# Patient Record
Sex: FEMALE | Race: WHITE | NOT HISPANIC OR LATINO | Employment: FULL TIME | URBAN - METROPOLITAN AREA
[De-identification: names, ages, dates, MRNs, and addresses within clinical notes are randomized per-mention and may not be internally consistent; named-entity substitution may affect disease eponyms.]

---

## 2017-05-13 ENCOUNTER — OFFICE VISIT - RIVER FALLS (OUTPATIENT)
Dept: FAMILY MEDICINE | Facility: CLINIC | Age: 32
End: 2017-05-13

## 2017-06-29 ENCOUNTER — COMMUNICATION - RIVER FALLS (OUTPATIENT)
Dept: FAMILY MEDICINE | Facility: CLINIC | Age: 32
End: 2017-06-29

## 2017-06-29 ENCOUNTER — OFFICE VISIT - RIVER FALLS (OUTPATIENT)
Dept: FAMILY MEDICINE | Facility: CLINIC | Age: 32
End: 2017-06-29

## 2017-06-29 ASSESSMENT — MIFFLIN-ST. JEOR: SCORE: 1341.81

## 2017-10-12 ENCOUNTER — OFFICE VISIT - RIVER FALLS (OUTPATIENT)
Dept: FAMILY MEDICINE | Facility: CLINIC | Age: 32
End: 2017-10-12

## 2018-07-19 ENCOUNTER — OFFICE VISIT - RIVER FALLS (OUTPATIENT)
Dept: FAMILY MEDICINE | Facility: CLINIC | Age: 33
End: 2018-07-19

## 2018-07-19 ASSESSMENT — MIFFLIN-ST. JEOR: SCORE: 1388.98

## 2020-06-30 ENCOUNTER — OFFICE VISIT - RIVER FALLS (OUTPATIENT)
Dept: FAMILY MEDICINE | Facility: CLINIC | Age: 35
End: 2020-06-30

## 2020-07-06 ENCOUNTER — OFFICE VISIT - HEALTHEAST (OUTPATIENT)
Dept: BEHAVIORAL HEALTH | Facility: TELEHEALTH | Age: 35
End: 2020-07-06

## 2020-07-06 DIAGNOSIS — F43.22 ADJUSTMENT DISORDER WITH ANXIOUS MOOD: ICD-10-CM

## 2020-07-15 ENCOUNTER — OFFICE VISIT - HEALTHEAST (OUTPATIENT)
Dept: BEHAVIORAL HEALTH | Facility: TELEHEALTH | Age: 35
End: 2020-07-15

## 2020-07-15 DIAGNOSIS — F43.22 ADJUSTMENT DISORDER WITH ANXIETY: ICD-10-CM

## 2020-07-15 ASSESSMENT — ANXIETY QUESTIONNAIRES
3. WORRYING TOO MUCH ABOUT DIFFERENT THINGS: MORE THAN HALF THE DAYS
GAD7 TOTAL SCORE: 11
6. BECOMING EASILY ANNOYED OR IRRITABLE: SEVERAL DAYS
2. NOT BEING ABLE TO STOP OR CONTROL WORRYING: MORE THAN HALF THE DAYS
5. BEING SO RESTLESS THAT IT IS HARD TO SIT STILL: SEVERAL DAYS
7. FEELING AFRAID AS IF SOMETHING AWFUL MIGHT HAPPEN: SEVERAL DAYS
IF YOU CHECKED OFF ANY PROBLEMS ON THIS QUESTIONNAIRE, HOW DIFFICULT HAVE THESE PROBLEMS MADE IT FOR YOU TO DO YOUR WORK, TAKE CARE OF THINGS AT HOME, OR GET ALONG WITH OTHER PEOPLE: SOMEWHAT DIFFICULT
1. FEELING NERVOUS, ANXIOUS, OR ON EDGE: MORE THAN HALF THE DAYS
4. TROUBLE RELAXING: MORE THAN HALF THE DAYS

## 2020-07-15 ASSESSMENT — PATIENT HEALTH QUESTIONNAIRE - PHQ9: SUM OF ALL RESPONSES TO PHQ QUESTIONS 1-9: 8

## 2020-09-08 ENCOUNTER — OFFICE VISIT - RIVER FALLS (OUTPATIENT)
Dept: FAMILY MEDICINE | Facility: CLINIC | Age: 35
End: 2020-09-08

## 2020-09-08 ASSESSMENT — MIFFLIN-ST. JEOR: SCORE: 1474.26

## 2021-05-27 ASSESSMENT — PATIENT HEALTH QUESTIONNAIRE - PHQ9: SUM OF ALL RESPONSES TO PHQ QUESTIONS 1-9: 8

## 2021-05-28 ASSESSMENT — ANXIETY QUESTIONNAIRES: GAD7 TOTAL SCORE: 11

## 2021-06-16 PROBLEM — F43.22 ADJUSTMENT DISORDER WITH ANXIETY: Status: ACTIVE | Noted: 2020-07-15

## 2022-01-30 ENCOUNTER — HEALTH MAINTENANCE LETTER (OUTPATIENT)
Age: 37
End: 2022-01-30

## 2022-02-11 VITALS
HEART RATE: 80 BPM | OXYGEN SATURATION: 99 % | BODY MASS INDEX: 25.18 KG/M2 | WEIGHT: 156 LBS | TEMPERATURE: 98.4 F | DIASTOLIC BLOOD PRESSURE: 87 MMHG | SYSTOLIC BLOOD PRESSURE: 143 MMHG

## 2022-02-11 VITALS
TEMPERATURE: 98.2 F | SYSTOLIC BLOOD PRESSURE: 106 MMHG | WEIGHT: 142.2 LBS | BODY MASS INDEX: 22.95 KG/M2 | HEART RATE: 60 BPM | DIASTOLIC BLOOD PRESSURE: 69 MMHG

## 2022-02-11 VITALS
HEIGHT: 66 IN | HEART RATE: 80 BPM | HEART RATE: 76 BPM | BODY MASS INDEX: 30.51 KG/M2 | SYSTOLIC BLOOD PRESSURE: 132 MMHG | SYSTOLIC BLOOD PRESSURE: 130 MMHG | DIASTOLIC BLOOD PRESSURE: 81 MMHG | WEIGHT: 167 LBS | TEMPERATURE: 98.8 F | HEIGHT: 66 IN | TEMPERATURE: 97.2 F | BODY MASS INDEX: 26.84 KG/M2 | DIASTOLIC BLOOD PRESSURE: 80 MMHG

## 2022-02-11 VITALS
HEIGHT: 66 IN | WEIGHT: 148.2 LBS | HEART RATE: 72 BPM | DIASTOLIC BLOOD PRESSURE: 60 MMHG | BODY MASS INDEX: 23.82 KG/M2 | SYSTOLIC BLOOD PRESSURE: 112 MMHG

## 2022-02-11 VITALS
DIASTOLIC BLOOD PRESSURE: 72 MMHG | OXYGEN SATURATION: 99 % | TEMPERATURE: 98.4 F | HEART RATE: 82 BPM | HEIGHT: 66 IN | SYSTOLIC BLOOD PRESSURE: 110 MMHG | BODY MASS INDEX: 22.14 KG/M2 | WEIGHT: 137.8 LBS

## 2022-02-16 NOTE — NURSING NOTE
CAGE Assessment Entered On:  7/3/2020 11:39 AM CDT    Performed On:  6/30/2020 11:39 AM CDT by Mihir Wiseman CMA               Assessment   Have you ever felt you should cut down on your drinking :   Yes   Have people annoyed you by criticizing your drinking :   No   Have you ever felt bad or guilty about your drinking :   Yes   Have you ever taken a drink first thing in the morning to steady your nerves or get rid of a hangover (Eye-opener) :   No   CAGE Score :   2    Mihir Wiseman CMA - 7/3/2020 11:39 AM CDT

## 2022-02-16 NOTE — NURSING NOTE
Comprehensive Intake Entered On:  6/30/2020 12:53 PM CDT    Performed On:  6/30/2020 12:45 PM CDT by Rosa Maria Castrejon CMA               Summary   Chief Complaint :   Patient presents with moles on left arm and chin that are changing in size and color. Also, c/o increased anxiety and stress due to son's new dx of muscular dystophy.    Last Menstrual Period :   6/1/2020 CDT   Menstrual Status :   Menarcheal   Weight Measured :   156 lb(Converted to: 156 lb 0 oz, 70.76 kg)    Height/Length Estimated :   66 cm(Converted to: 2 ft 2 in, 25.98 in)    Systolic Blood Pressure :   143 mmHg (HI)    Diastolic Blood Pressure :   87 mmHg   Mean Arterial Pressure :   106 mmHg   Peripheral Pulse Rate :   80 bpm   BP Site :   Right arm   BP Method :   Electronic   Temperature Tympanic :   98.4 DegF(Converted to: 36.9 DegC)    Oxygen Saturation :   99 %   Rosa Maria Castrejon CMA - 6/30/2020 12:45 PM CDT   Health Status   Allergies Verified? :   Yes   Medication History Verified? :   Yes   Medical History Verified? :   Yes   Pre-Visit Planning Status :   Completed   Tobacco Use? :   Current every day smoker   (Comment: Chewing tobacco [Rosa Maria Castrejon CMA - 6/30/2020 12:45 PM CDT] )   Rosa Maria Castrejon CMA - 6/30/2020 12:45 PM CDT   Consents   Consent for Immunization Exchange :   Consent Granted   Consent for Immunizations to Providers :   Consent Granted   Rosa Maria Castrejon CMA - 6/30/2020 12:45 PM CDT   Meds / Allergies   (As Of: 6/30/2020 12:53:33 PM CDT)   Allergies (Active)   No Known Medication Allergies  Estimated Onset Date:   Unspecified ; Created By:   Rosa Maria Castrejon CMA; Reaction Status:   Active ; Category:   Drug ; Substance:   No Known Medication Allergies ; Type:   Allergy ; Updated By:   Rosa Maria Castrejon CMA; Reviewed Date:   6/30/2020 12:49 PM CDT        Medication List   (As Of: 6/30/2020 12:53:33 PM CDT)   Prescription/Discharge Order    cyclobenzaprine  :   cyclobenzaprine ; Status:   Prescribed ; Ordered As Mnemonic:    cyclobenzaprine 5 mg oral tablet ; Simple Display Line:   1-2 tab(s), Oral, q8 hrs, 10 tab(s), 0 Refill(s) ; Ordering Provider:   Turner Almaguer MD; Catalog Code:   cyclobenzaprine ; Order Dt/Tm:   7/19/2018 1:40:18 PM CDT            ID Risk Screen   Recent Travel History :   No recent travel   Family Member Travel History :   No recent travel   Other Exposure to Infectious Disease :   Unknown   Rosa Maria Castrejon CMA - 6/30/2020 12:45 PM CDT

## 2022-02-16 NOTE — NURSING NOTE
Comprehensive Intake Entered On:  9/8/2020 11:21 AM CDT    Performed On:  9/8/2020 11:19 AM CDT by Jaelyn Hurt               Summary   Chief Complaint :   Fell down stairs over the weekend. Left knee pain.    Menstrual Status :   Menarcheal   Weight Measured :   167.0 lb(Converted to: 167 lb 0 oz, 75.75 kg)    Height Measured :   66 in(Converted to: 5 ft 6 in, 167.64 cm)    Body Mass Index :   26.95 kg/m2 (HI)    Body Surface Area :   1.88 m2   Height/Length Estimated :   66 cm(Converted to: 2 ft 2 in, 25.98 in)    Systolic Blood Pressure :   132 mmHg (HI)    Diastolic Blood Pressure :   81 mmHg (HI)    Mean Arterial Pressure :   98 mmHg   Peripheral Pulse Rate :   80 bpm   BP Method :   Electronic   HR Method :   Electronic   Temperature Tympanic :   97.2 DegF(Converted to: 36.2 DegC)  (LOW)    Jaelyn Hurt - 9/8/2020 11:19 AM CDT   Health Status   Allergies Verified? :   Yes   Medication History Verified? :   Yes   Tobacco Use? :   Current every day smoker   Jaelyn Hurt - 9/8/2020 11:19 AM CDT   Meds / Allergies   (As Of: 9/8/2020 11:21:07 AM CDT)   Allergies (Active)   No Known Medication Allergies  Estimated Onset Date:   Unspecified ; Created By:   Rosa Maria Castrejon CMA; Reaction Status:   Active ; Category:   Drug ; Substance:   No Known Medication Allergies ; Type:   Allergy ; Updated By:   Rosa Maria Castrejon CMA; Reviewed Date:   6/30/2020 1:06 PM CDT        Medication List   (As Of: 9/8/2020 11:21:07 AM CDT)   Prescription/Discharge Order    escitalopram  :   escitalopram ; Status:   Processing ; Ordered As Mnemonic:   Lexapro 5 mg oral tablet ; Ordering Provider:   Gerardo Freeman MD; Action Display:   Complete ; Catalog Code:   escitalopram ; Order Dt/Tm:   9/8/2020 11:20:00 AM CDT          cyclobenzaprine  :   cyclobenzaprine ; Status:   Processing ; Ordered As Mnemonic:   cyclobenzaprine 5 mg oral tablet ; Ordering Provider:   Turner Almaguer MD; Action Display:   Complete ; Catalog Code:    cyclobenzaprine ; Order Dt/Tm:   9/8/2020 11:20:00 AM CDT            ID Risk Screen   Recent Travel History :   No recent travel   Family Member Travel History :   No recent travel   Other Exposure to Infectious Disease :   Unknown   Jaelyn Hurt - 9/8/2020 11:19 AM CDT

## 2022-02-16 NOTE — PROGRESS NOTES
Patient:   YUDITH ARRIETA            MRN: [id number]            FIN: [id number]               Age:   34 years     Sex:  Female     :  1985   Associated Diagnoses:   New ACL tear   Author:   Gerardo Freeman MD      Visit Information      Date of Service: 2020 11:14 am  Performing Location: [facility]  Encounter#: [id number]      Chief Complaint   2020 11:19 AM CDT    Fell down stairs over the weekend. Left knee pain.        History of Present Illness   chief complaint and symptoms as noted above confirmed with patient   She was carrying her daughter down the stairs when she lost her balance.  She fell down several stairs and watch her knee buckle inwardly.  She is had 2 prior ACL reconstruction is one prior meniscectomy of that knee.  She has a knee swelled quickly is been difficult to walk.  No other injuries other than a minor left ankle sprain.         Review of Systems   Constitutional:  Negative except as documented in history of present illness.    Musculoskeletal:  Negative except as documented in history of present illness.    Integumentary:  Negative.    Neurologic:  Negative except as documented in history of present illness.       Physical Examination   Vital Signs   2020 11:19 AM CDT Temperature Tympanic 97.2 DegF  LOW    Peripheral Pulse Rate 80 bpm    HR Method Electronic    Systolic Blood Pressure 132 mmHg  HI    Diastolic Blood Pressure 81 mmHg  HI    Mean Arterial Pressure 98 mmHg    BP Method Electronic      Measurements from flowsheet : Measurements   2020 11:19 AM CDT Height Measured - Standard 66 in    Height/Length Estimated 66 cm    Weight Measured - Standard 167.0 lb    BSA 1.88 m2    Body Mass Index 26.95 kg/m2  HI      General:  Alert and oriented, No acute distress.    Musculoskeletal:       Lower extremity exam: Knee ( Left, Anterior, Posterior, Lateral, Medial, Effusion, Swelling, Tenderness, Range of motion ( Diminished ) ).    Neurologic:  Alert,  Oriented.       Impression and Plan   Diagnosis     New ACL tear (IND19-WW S83.519A).     Course:  Not well controlled.    Plan:  Findings very suspicious for a re-tearing of her ACL graft.  We will get MRI set up in the meantime ice Tylenol ibuprofen crutches  .

## 2022-02-16 NOTE — NURSING NOTE
Generalized Anxiety Disorder Screening Entered On:  7/3/2020 11:40 AM CDT    Performed On:  6/30/2020 11:39 AM CDT by Mihir Wiseman CMA               Generalized Anxiety Disorder Screening   GIOVANNA Nervous, Anxious On Edge :   Nearly every day   GIOVANNA Control Worrying B :   More than half the days   GIOVANNA Worrying Too Much :   Several days   GIOVANNA Trouble Relaxing :   Nearly every day   GIOVANNA Restless :   More than half the days   GIOVANNA Easily Annoyed/Irritable :   Several days   GIOVANNA Afraid :   More than half the days   GIOVANNA Total Screening Score :   14    GIOVANNA Difficulty with Work, Home, Others :   Not difficult at all   Mihir Wiseman CMA - 7/3/2020 11:39 AM CDT

## 2022-02-16 NOTE — PROGRESS NOTES
Patient:   YUDITH ARRIETA            MRN: [id number]            FIN: [id number]               Age:   32 years     Sex:  Female     :  1985   Associated Diagnoses:   Right shoulder pain   Author:   Turner Almaguer MD      Chief Complaint   2018 11:05 AM CDT   c/o right sided neck and shoulder pain. When she does certain movements she gets pain that shoots down her right arm.      History of Present Illness   see chief complaint as noted above and confirmed with the patient   32 year old female presenting with right sided neck pain, the pain radiates down into right shower. If she tilts her head to the right side she gets a sharp pain that runs down through her right arm and hand. She tried to  her son out of his crib and had a lot of pain. Denies any recent or past injury. Denies any surgery . She is currently moving from her house and has been moving a lot around the house.      Review of Systems   Constitutional:  No fever, No chills.    Respiratory:  No shortness of breath.    Cardiovascular:  No chest pain.    Gastrointestinal:  No nausea, No vomiting.    Musculoskeletal:  Neck pain (right side), right shoulder pain, right arm pain, right hand pain.    Neurologic:  No numbness, No tingling, No headache.    Psychiatric:  Negative.              Health Status   Allergies:    Allergic Reactions (Selected)  No known allergies   Medications:  (Selected)      Problem list:    All Problems  Tobacco abuse / ICD-9-.1 / Confirmed  Inactive: Chemical Dependency / ICD-9-.90  Resolved: Pregnant / SNOMED CT 818616219      Histories   Past Medical History:    Active  Tobacco abuse (305.1)   Family History:       Procedure history:    Arthroscopy of knee with medial meniscus repair (24188421) on 10/16/2017 at 32 Years.  Comments:  2017 12:28 PM - Nati Simms.  Cystoscopy (68493041) on 11/15/2011 at 26 Years.  Repair of umbilical hernia (08964810) in  at 8 Years.  ACL/Meniscus Repair  Left Knee (x2).   Social History:        Alcohol Assessment            Current, 1-2 times per week, 3 drinks/episode average.      Tobacco Assessment            Past, Cigarettes, 20 per day.  8 year(s).  Started age 20 Years.  Stopped age 27 Years.      Substance Abuse Assessment            Past, Marijuana      Employment and Education Assessment            Employed      Home and Environment Assessment            Marital status: .      Nutrition and Health Assessment            Type of diet: Regular.      Exercise and Physical Activity Assessment            Exercise frequency: 3-4 times/week.  Exercise type: Running, Yoga.      Sexual Assessment            Sexually active: Yes.  Sexual orientation: Heterosexual.  Contraceptive Use Details: None.        Physical Examination   Vital Signs   7/19/2018 11:05 AM CDT Peripheral Pulse Rate 72 bpm    Systolic Blood Pressure 112 mmHg    Diastolic Blood Pressure 60 mmHg    Mean Arterial Pressure 77 mmHg      Measurements from flowsheet : Measurements   7/19/2018 11:05 AM CDT Height Measured - Standard 66 in    Weight Measured - Standard 148.2 lb    BSA 1.77 m2    Body Mass Index 23.92 kg/m2      General:  Alert and oriented, No acute distress.    Eye:  Pupils are equal, round and reactive to light, Normal conjunctiva.    HENT:  Oral mucosa is moist.    Neck:  Supple.    Respiratory:  Respirations are non-labored.    Cardiovascular:  Normal rate, Regular rhythm, No edema.    Gastrointestinal:  Non-distended.    Musculoskeletal:  Normal gait.    Integumentary:  Warm, No rash.    Psychiatric:  Cooperative, Appropriate mood & affect, Normal judgment.       Review / Management   Results review      Impression and Plan   Diagnosis     Right shoulder pain (PDL92-RD M25.511).     Plan:  exam is consistant with muscle strain of the back of the neck. Will give her cyclobenzapine 5mg as needed for the pain. Discussed trying things like message, stretching, heat and patches like  salonpas to help with symtpoms.  I, Edna Osuna [facility], acted solely as a scribe for, and in the presence of Dr. Turner Almaguer who performed the service..

## 2022-02-16 NOTE — TELEPHONE ENCOUNTER
---------------------  From: Jaelyn Hurt   Sent: 9/15/2020 9:28:42 AM CDT  Subject: Result: MRI     Spoke with patient at 9:28am regarding recent MRI.    Per TFS; MRI shows a mild sprain of medial collateral ligament and questionable tear of the meniscus.  She should give it 6 weeks to heal and if not better by then, will refer to ortho. Patient agrees and has no questions.

## 2022-02-16 NOTE — TELEPHONE ENCOUNTER
Order is faxed to [facility] and they will contact patient and schedule.  Patient informed.

## 2022-02-16 NOTE — PROGRESS NOTES
Patient:   YUDITH ARRIETA            MRN: [id number]            FIN: [id number]               Age:   34 years     Sex:  Female     :  1985   Associated Diagnoses:   Anxiety state   Author:   Gerardo Freeman MD      Visit Information      Date of Service: 2020 12:40 pm  Performing Location: [facility]  Encounter#: [id number]      Primary Care Provider (PCP):  [id number] -UNKNOWN,    NPI# [id number]      Chief Complaint   2020 12:45 PM CDT   Patient presents with moles on left arm and chin that are changing in size and color. Also, c/o increased anxiety and stress due to son's new dx of muscular dystophy.        History of Present Illness   Patient is in today for a mole check but really she is here for anxiety.  She is got some moles on her arm and her chin really have not changed too much over time she like to does have them checked out she notes a bigger concern though is that her 4-year-old son was diagnosed recently with Duchenne s muscular dystrophy.  She has a lot of anxiety regarding this would like medication for it.         Review of Systems   Constitutional:  Negative except as documented in history of present illness.    Integumentary:  Negative except as documented in history of present illness.    Psychiatric:  Elizabeth.       Health Status   Allergies:    Allergic Reactions (Selected)  No Known Medication Allergies   Medications:  (Selected)   Prescriptions  Prescribed  Lexapro 5 mg oral tablet: = 1 tab(s) ( 5 mg ), Oral, daily, # 30 tab(s), 1 Refill(s), Type: Maintenance, Pharmacy: [id number], 1 tab(s) Oral daily, 66, in, 18 11:05:00 CDT, Height Measured, 156, lb, 20 12:45:00 CDT, Weight Measured  cyclobenzaprine 5 mg oral tablet: 1-2 tab(s), Oral, q8 hrs, # 10 tab(s), 0 Refill(s), Type: Maintenance, Pharmacy: [id number], 1-2 tab(s) Oral q8 hrs   Problem list:    All Problems  Tobacco abuse / ICD-9-.1 / Confirmed      Histories    Past Medical History:    Active  Tobacco abuse (305.1)  Resolved  Pregnancy (043726329): Onset on 10/8/2018 at 33 years.  Resolved on 7/5/2019 at 33 years.   Family History:    No family history items have been selected or recorded.   Procedure history:    Arthroscopy of knee with medial meniscus repair (SNOMED CT 44626584) performed by Al Rick MD on 10/16/2017 at 32 Years.  Comments:  11/21/2017 12:28 PM CST - Demi , Nati  Left.  Cystoscopy (SNOMED CT 09594468) performed by Dr. Wilks on 11/15/2011 at 26 Years.  Repair of umbilical hernia (SNOMED CT 12739750) in 1993 at 8 Years.  ACL/Meniscus Repair Left Knee (x2).   Social History:        Alcohol Assessment            Current, 1-2 times per week, 3 drinks/episode average.      Tobacco Assessment            Past, Cigarettes, 20 per day.  8 year(s).  Started age 20 Years.  Stopped age 27 Years.      Substance Abuse Assessment            Past, Marijuana      Employment and Education Assessment            Employed      Home and Environment Assessment            Marital status: .      Nutrition and Health Assessment            Type of diet: Regular.      Exercise and Physical Activity Assessment            Exercise frequency: 3-4 times/week.  Exercise type: Running, Yoga.      Sexual Assessment            Sexually active: Yes.  Sexual orientation: Heterosexual.  Contraceptive Use Details: None.        Physical Examination   Vital Signs   6/30/2020 12:45 PM CDT Temperature Tympanic 98.4 DegF    Peripheral Pulse Rate 80 bpm    Systolic Blood Pressure 143 mmHg  HI    Diastolic Blood Pressure 87 mmHg    Mean Arterial Pressure 106 mmHg    BP Site Right arm    BP Method Electronic    Oxygen Saturation 99 %      Measurements from flowsheet : Measurements   6/30/2020 12:45 PM CDT Height/Length Estimated 66 cm    Weight Measured - Standard 156 lb      General:  Alert and oriented, No acute distress.    Integumentary:  She has several very small  benign-appearing nevi on her left arm a few on her chin none appear suspicious  .    Neurologic:  Alert, Oriented.       Impression and Plan   Diagnosis     Anxiety state (LWG14-NM F41.1).     Course:  Not progressing as expected.    Plan:  Reassurance on her moles.  We discussed anxiety and treatment options he is going to see therapy we will went and put her on Lexapro warned of side effects she will see us back in 4 weeks  ,  15 min spent Face-to-face   .    Patient Instructions:       Counseled: Patient, Regarding diagnosis, Regarding treatment, Regarding medications.

## 2022-02-16 NOTE — PROGRESS NOTES
Patient:   YUDITH ARRIETA            MRN: [id number]            FIN: [id number]               Age:   32 years     Sex:  Female     :  1985   Associated Diagnoses:   Acute medial meniscal tear   Author:   Gerardo Freeman MD      Preoperative Information   Indication for surgery:  Meniscal tear.    Accompanied by:  No one.    Source of history:  Self.           Chief Complaint   10/12/2017 11:02 AM CDT  Preop.  Left knee meniscus repair. Dr. Rick.  [facility].  10/16/17        Review of Systems   Constitutional:  Negative.    Eye:  Negative.    Ear/Nose/Mouth/Throat:  Negative.    Respiratory:  Negative.    Cardiovascular:  Negative.    Gastrointestinal:  Negative.    Genitourinary:  Negative.    Hematology/Lymphatics:  Negative.    Endocrine:  Negative.    Immunologic:  Negative.    Musculoskeletal:  Negative.    Integumentary:  Negative.    Neurologic:  Negative.       Health Status   Allergies:    Allergic Reactions (Selected)  No known allergies   Medications:  (Selected)   Prescriptions  Prescribed  amoxicillin 875 mg oral tablet: 1 tab(s) ( 875 mg ), PO, BID, # 20 tab(s), 0 Refill(s), Type: Maintenance, Pharmacy: [id number], 1 tab(s) po bid,x10 day(s)  Documented Medications  Documented  Low-Ogestrel 0.3 mg-30 mcg oral tablet: 1 tab(s), po, daily, 0 Refill(s), Type: Maintenance  Prenatal Multivitamins with Vitamin B Complex, Vitamin C, Minerals and L-Methylfolate oral capsule...: 1 cap(s), po, daily, 0 Refill(s), Type: Maintenance   Problem list:    All Problems  Tobacco abuse / ICD-9-.1 / Confirmed  Inactive: Chemical Dependency / ICD-9-.90  Resolved: Pregnant / SNOMED CT 508433225      Histories   Past Medical History:    Active  Tobacco abuse (305.1)   Family History:       Procedure history:    Cystoscopy (SNOMED CT 75976506) performed by Dr. Wilks on 11/15/2011 at 26 Years.  Repair of umbilical hernia (SNOMED CT 28458720) in  at 8 Years.  ACL/Meniscus Repair Left  Knee (x2).   Social History:        Alcohol Assessment            Current, 1-2 times per week, 3 drinks/episode average.      Tobacco Assessment            Past, Cigarettes, 20 per day.  8 year(s).  Started age 20 Years.  Stopped age 27 Years.      Substance Abuse Assessment            Past, Marijuana      Employment and Education Assessment            Employed      Home and Environment Assessment            Marital status: .      Nutrition and Health Assessment            Type of diet: Regular.      Exercise and Physical Activity Assessment            Exercise frequency: 3-4 times/week.  Exercise type: Running, Yoga.      Sexual Assessment            Sexually active: Yes.  Sexual orientation: Heterosexual.  Contraceptive Use Details: None.       Has no history of anemia.  Has  no history of DVT or pulmonary embolism.  Has no personal history of bleeding problems.   Has no personal or family history of anesthesia reactions.  Patient  does not have active tuberculosis.    S/he  has not taken aspirin or aspirin containing products in the last week.     S/he  has not taken Plavix (Clopidogrel) in the last 2 weeks.    S/he  has not taken warfarin in the past week.    S/he  has not been on corticosteroids for more than 2 weeks recently.      S/he  is not DNR before, during or after surgery.      Asthma  or Bronchitis? no  Diabetes? no       Insulin/Orals?   Seizure Disorder? no  CKD?no  Thyroid Disease?no  Liver Diseaseno  CVA?no         Physical Examination   Vital Signs   10/12/2017 11:02 AM CDT Temperature Tympanic 98.2 DegF    Peripheral Pulse Rate 60 bpm    HR Method Electronic    Systolic Blood Pressure 106 mmHg    Diastolic Blood Pressure 69 mmHg    Mean Arterial Pressure 81 mmHg    BP Method Electronic      Measurements from flowsheet : Measurements   10/12/2017 11:02 AM CDT  Weight Measured - Standard                142.2 lb     General:  Alert and oriented, No acute distress.    Eye:  Pupils are equal,  round and reactive to light, Extraocular movements are intact.    HENT:  Normocephalic, Tympanic membranes are clear, Normal hearing, Oral mucosa is moist.    Neck:  Supple, Non-tender, No carotid bruit, No jugular venous distention, No lymphadenopathy, No thyromegaly.    Respiratory:  Lungs are clear to auscultation, Respirations are non-labored, Breath sounds are equal.    Cardiovascular:  Normal rate, Regular rhythm, No murmur, Good pulses equal in all extremities, No edema.    Gastrointestinal:  Soft, Non-tender, Non-distended, Normal bowel sounds, No organomegaly.    Musculoskeletal:  Normal range of motion, Normal strength, No tenderness, No swelling, No deformity.    Integumentary:  Warm, Dry.    Neurologic:  Alert, Oriented, Normal sensory, Normal motor function, No focal deficits, Cranial Nerves II-XII are grossly intact, Normal deep tendon reflexes.    Psychiatric:  Cooperative, Appropriate mood & affect, Normal judgment.       Impression and Plan   Diagnosis     Acute medial meniscal tear (UWO16-MG S83.249A).     Condition:  ok for surgery asa1.

## 2022-02-16 NOTE — PROGRESS NOTES
Patient:   YUDITH ARRIETA            MRN: [id number]            FIN: [id number]               Age:   31 years     Sex:  Female     :  1985   Associated Diagnoses:   Sore throat   Author:   Alfredo Henao PA-C      Chief Complaint   2017 8:50 AM CDT    pt here for poss strep throat x 2 days, states white spots in back of throat, low grade fever 99.5, ears beginning to be sore from sore throat      History of Present Illness   Chief complaint and symptoms noted above and confirmed with patient       Review of Systems   Constitutional:  Fever.    Ear/Nose/Mouth/Throat:  Sore throat.       Health Status   Allergies:    Allergic Reactions (Selected)  No known allergies   Problem list:    All Problems  Tobacco abuse / ICD-9-.1 / Confirmed  Inactive: Chemical Dependency / ICD-9-.90  Resolved: Pregnant / SNOMED CT [id number]   Medications:  (Selected)   Documented Medications  Documented  Low-Ogestrel 0.3 mg-30 mcg oral tablet: 1 tab(s), po, daily, 0 Refill(s), Type: Maintenance  Prenatal Multivitamins with Vitamin B Complex, Vitamin C, Minerals and L-Methylfolate oral capsule...: 1 cap(s), po, daily, 0 Refill(s), Type: Maintenance      Histories   Past Medical History:    Active  Tobacco abuse (305.1)   Family History:       Procedure history:    Cystoscopy (55063906) on 11/15/2011 at 26 Years.  Repair of umbilical hernia (07172287) in  at 8 Years.  ACL/Meniscus Repair Left Knee (x2).   Social History:        Alcohol Assessment            Current, 1-2 times per week, 3 drinks/episode average.      Tobacco Assessment            Past, Cigarettes, 20 per day.  8 year(s).  Started age 20 Years.  Stopped age 27 Years.      Substance Abuse Assessment            Past, Marijuana      Employment and Education Assessment            Employed      Home and Environment Assessment            Marital status: .      Nutrition and Health Assessment            Type of diet: Regular.      Exercise and Physical  Activity Assessment            Exercise frequency: 3-4 times/week.  Exercise type: Running, Yoga.      Sexual Assessment            Sexually active: Yes.  Sexual orientation: Heterosexual.  Contraceptive Use Details: None.        Physical Examination   Vital Signs   6/29/2017 8:50 AM CDT Temperature Tympanic 98.4 DegF    Peripheral Pulse Rate 82 bpm    Pulse Site Radial artery    Systolic Blood Pressure 110 mmHg    Diastolic Blood Pressure 72 mmHg    Mean Arterial Pressure 85 mmHg    BP Site Right arm    Oxygen Saturation 99 %      Measurements from flowsheet : Measurements   6/29/2017 8:50 AM CDT Height Measured - Standard 66 in    Weight Measured - Standard 137.8 lb    BSA 1.7 m2    Body Mass Index 22.24 kg/m2      General:  No acute distress.    HENT:  Tympanic membranes are clear, No sinus tenderness, nares are patent, oropharynx is moderately enlarged and inflamed with extensive patchy white exudate.    Neck:  Supple, moderate anterior cervical lymphadenopathy, moderate tenderness.    Respiratory:  Lungs are clear to auscultation.       Review / Management   Results review:       Interpretation: rapid strep is negative; culture pending, will call if abnormal results..       Impression and Plan   Diagnosis     Sore throat (MBJ77-EX J02.9).     Summary:  Fluids, salt water gargles, popsicles,  throat lozenges, NSAIDS; follow up if not improving.    Orders     Orders   Charges (Evaluation and Management):  19899 office outpatient visit 15 minutes (Charge) (Order): Quantity: 1, Sore throat.

## 2022-02-16 NOTE — PROGRESS NOTES
Patient:   YUDITH ARRIETA            MRN: [id number]            FIN: [id number]               Age:   31 years     Sex:  Female     :  1985   Associated Diagnoses:   Acute torn meniscus   Author:   Gerardo Freeman MD      Chief Complaint   2017 10:33 AM CDT   c/o left knee pain happened this morning from kneeling to standing position, unable to bare weight  (Modified)       History of Present Illness   chief complaint and symptoms as noted above confirmed with patient   felt medial tearing  hx acl and meniscus in past      Review of Systems   Constitutional:  Negative except as documented in history of present illness.    Musculoskeletal:  Negative except as documented in history of present illness.    Integumentary:  Negative.    Neurologic:  Negative.       Health Status   Allergies:    Allergic Reactions (Selected)  No known allergies   Problem list:    All Problems  Tobacco abuse / ICD-9-.1 / Confirmed      Physical Examination   Vital Signs   2017 10:33 AM CDT Temperature Tympanic 98.8 DegF    Peripheral Pulse Rate 76 bpm    Pulse Site Radial artery    HR Method Manual    Systolic Blood Pressure 130 mmHg    Diastolic Blood Pressure 80 mmHg    Mean Arterial Pressure 97 mmHg    BP Site Right arm    BP Method Manual      Measurements from flowsheet : Measurements   2017 10:33 AM CDT   Height Measured - Standard                66 in     General:  Alert and oriented, No acute distress.    Musculoskeletal:       Lower extremity exam: Knee ( Left, Medial, Mild, No effusion, Tenderness, Range of motion ( slight decr extension ), Ren test ( Positive ) ).    Neurologic:  Alert, Oriented.       Impression and Plan   Diagnosis     Acute torn meniscus (WBT77-TL S83.209A).     Plan:  crutches, nsaid  mri in 1 week if not improving.    Patient Instructions:       Counseled: Patient, Regarding treatment, Regarding diagnosis, Activity.

## 2022-02-16 NOTE — NURSING NOTE
Depression Screening Entered On:  7/3/2020 11:39 AM CDT    Performed On:  6/30/2020 11:39 AM CDT by Mihir Wiseman CMA               Depression Screening   Little Interest - Pleasure in Activities :   Several days   Feeling Down, Depressed, Hopeless :   Several days   Initial Depression Screen Score :   2 Score   Poor Appetite or Overeating :   More than half the days   Trouble Falling or Staying Asleep :   More than half the days   Feeling Tired or Little Energy :   Several days   Feeling Bad About Yourself :   Several days   Trouble Concentrating :   More than half the days   Moving or Speaking Slowly :   Not at all   Thoughts Better Off Dead or Hurting Self :   Not at all   GIOVANNA Difficulty with Work, Home, Others :   Somewhat difficult   Detailed Depression Screen Score :   8    Total Depression Screen Score :   10    Mihir Wiseman CMA - 7/3/2020 11:39 AM CDT

## 2022-04-19 ENCOUNTER — TELEPHONE (OUTPATIENT)
Dept: BEHAVIORAL HEALTH | Facility: CLINIC | Age: 37
End: 2022-04-19

## 2022-04-19 ENCOUNTER — VIRTUAL VISIT (OUTPATIENT)
Dept: FAMILY MEDICINE | Facility: CLINIC | Age: 37
End: 2022-04-19
Payer: COMMERCIAL

## 2022-04-19 DIAGNOSIS — F32.A ANXIETY AND DEPRESSION: Primary | ICD-10-CM

## 2022-04-19 DIAGNOSIS — F41.9 ANXIETY AND DEPRESSION: Primary | ICD-10-CM

## 2022-04-19 PROBLEM — F41.1 ANXIETY STATE: Status: ACTIVE | Noted: 2022-04-19

## 2022-04-19 PROBLEM — F43.22 ADJUSTMENT DISORDER WITH ANXIETY: Status: RESOLVED | Noted: 2020-07-15 | Resolved: 2022-04-19

## 2022-04-19 PROCEDURE — 99214 OFFICE O/P EST MOD 30 MIN: CPT | Mod: TEL | Performed by: FAMILY MEDICINE

## 2022-04-19 PROCEDURE — 96127 BRIEF EMOTIONAL/BEHAV ASSMT: CPT | Performed by: FAMILY MEDICINE

## 2022-04-19 ASSESSMENT — PATIENT HEALTH QUESTIONNAIRE - PHQ9: SUM OF ALL RESPONSES TO PHQ QUESTIONS 1-9: 13

## 2022-04-19 NOTE — PROGRESS NOTES
Shruthi is a 36 year old who is being evaluated via a billable telephone visit.      What phone number would you like to be contacted at? [phone]   How would you like to obtain your AVS? MyChart    Assessment & Plan     Anxiety and depression  Patient with mixed anxiety and depression.  Will trial Zoloft.  She did have trouble with insomnia with Lexapro if unsuccessful will look at a switch to Wellbutrin.  Encouraged her to have outpatient therapy as well.  Also discussed alcohol intake.  Follow-up with me in 4 weeks- sertraline (ZOLOFT) 50 MG tablet; Take 1 tablet (50 mg) by mouth daily             Depression Screening Follow Up    PHQ 4/19/2022   PHQ-9 Total Score 13   Q9: Thoughts of better off dead/self-harm past 2 weeks Not at all     Last PHQ-9 4/19/2022   1.  Little interest or pleasure in doing things 2   2.  Feeling down, depressed, or hopeless 2   3.  Trouble falling or staying asleep, or sleeping too much 3   4.  Feeling tired or having little energy 3   5.  Poor appetite or overeating 2   6.  Feeling bad about yourself 1   7.  Trouble concentrating 0   8.  Moving slowly or restless 0   Q9: Thoughts of better off dead/self-harm past 2 weeks 0   PHQ-9 Total Score 13   Difficulty at work, home, or with people Somewhat difficult       Follow Up Actions Taken           Return in about 4 weeks (around 5/17/2022) for Follow up.    Gerardo Freeman MD  [facility]    Subjective   Shruthi is a 36 year old who presents for the following health issues patient has struggled with anxiety and depression in the past.  Feels like she needs treatment at this point.  She is  has a 5 and a 2-year-old at home  is in good health.  She works at a [employer].  Recently had COVID.  Does drink 3 alcoholic beverages a day either beer or vodka.  She has been able to quit for months at a time in the past.  No other substance abuse.  No suicidal thoughts or ideations    HPI     Discuss  depression sx. Has never been dx'd or treated. Was treated for anxiety a couple years ago with medication and it caused insomnia - Lexapro.     PATIENT HEALTH QUESTIONNAIRE-9 (PHQ - 9)    Over the last 2 weeks, how often have you been bothered by any of the following problems?    1. Little interest or pleasure in doing things -  More than half the days   2. Feeling down, depressed, or hopeless -  More than half the days   3. Trouble falling or staying asleep, or sleeping too much - Nearly every day   4. Feeling tired or having little energy -  Nearly every day   5. Poor appetite or overeating -  More than half the days   6. Feeling bad about yourself - or that you are a failure or have let yourself or your family down -  Several days   7. Trouble concentrating on things, such as reading the newspaper or watching television - Not at all   8. Moving or speaking so slowly that other people could have noticed? Or the opposite - being so fidgety or restless that you have been moving around a lot more than usual Not at all   9. Thoughts that you would be better off dead or of hurting  yourself in some way Not at all   Total Score: 13     If you checked off any problems, how difficult have these problems made it for you to do your work, take care of things at home, or get along with other people? Somewhat difficult    Developed by Haritha Angela, Ana Lilia Her, Christopher Mahajan and colleagues, with an educational chencho from [employer] No permission required to reproduce, translate, display or distribute. permission required to reproduce, translate, display or distribute.        Review of Systems   Constitutional, HEENT, cardiovascular, pulmonary, gi and gu systems are negative, except as otherwise noted.      Objective           Vitals:  No vitals were obtained today due to virtual visit.    Physical Exam   healthy, alert and no distress  PSYCH: Alert and oriented times 3; coherent speech, normal   rate and  volume, able to articulate logical thoughts, able   to abstract reason, no tangential thoughts, no hallucinations   or delusions  Her affect is normal  RESP: No cough, no audible wheezing, able to talk in full sentences  Remainder of exam unable to be completed due to telephone visits                Phone call duration: 5 minutes

## 2022-04-19 NOTE — TELEPHONE ENCOUNTER
Phone Encounter   [facility] spoke to patient, by PCP request. [facility] introduced self and role to patient. Patient interested in scheduling with [facility]. Patient scheduled for 5/3/2022.    MIKKI Haley, [facility]

## 2022-05-02 ASSESSMENT — ANXIETY QUESTIONNAIRES
6. BECOMING EASILY ANNOYED OR IRRITABLE: MORE THAN HALF THE DAYS
1. FEELING NERVOUS, ANXIOUS, OR ON EDGE: MORE THAN HALF THE DAYS
7. FEELING AFRAID AS IF SOMETHING AWFUL MIGHT HAPPEN: SEVERAL DAYS
7. FEELING AFRAID AS IF SOMETHING AWFUL MIGHT HAPPEN: SEVERAL DAYS
GAD7 TOTAL SCORE: 11
GAD7 TOTAL SCORE: 11
2. NOT BEING ABLE TO STOP OR CONTROL WORRYING: SEVERAL DAYS
3. WORRYING TOO MUCH ABOUT DIFFERENT THINGS: MORE THAN HALF THE DAYS
GAD7 TOTAL SCORE: 11
4. TROUBLE RELAXING: MORE THAN HALF THE DAYS
5. BEING SO RESTLESS THAT IT IS HARD TO SIT STILL: SEVERAL DAYS

## 2022-05-02 ASSESSMENT — PATIENT HEALTH QUESTIONNAIRE - PHQ9
SUM OF ALL RESPONSES TO PHQ QUESTIONS 1-9: 10
SUM OF ALL RESPONSES TO PHQ QUESTIONS 1-9: 10
10. IF YOU CHECKED OFF ANY PROBLEMS, HOW DIFFICULT HAVE THESE PROBLEMS MADE IT FOR YOU TO DO YOUR WORK, TAKE CARE OF THINGS AT HOME, OR GET ALONG WITH OTHER PEOPLE: SOMEWHAT DIFFICULT

## 2022-05-03 ENCOUNTER — OFFICE VISIT (OUTPATIENT)
Dept: BEHAVIORAL HEALTH | Facility: CLINIC | Age: 37
End: 2022-05-03
Payer: COMMERCIAL

## 2022-05-03 DIAGNOSIS — F43.23 ADJUSTMENT DISORDER WITH MIXED ANXIETY AND DEPRESSED MOOD: Primary | ICD-10-CM

## 2022-05-03 PROCEDURE — 90834 PSYTX W PT 45 MINUTES: CPT | Performed by: SOCIAL WORKER

## 2022-05-03 ASSESSMENT — ANXIETY QUESTIONNAIRES: GAD7 TOTAL SCORE: 11

## 2022-05-03 ASSESSMENT — PATIENT HEALTH QUESTIONNAIRE - PHQ9: SUM OF ALL RESPONSES TO PHQ QUESTIONS 1-9: 10

## 2022-05-03 NOTE — PROGRESS NOTES
[facility] Primary Care: : Integrated Behavioral Health  May 3, 2022    Behavioral Health Clinician Progress Note    Patient Name: Shruthi Rogers           Service Type:  Individual      Service Location:   Face to Face in Clinic     Session Start Time: 2:56pm  Session End Time: 3:45pm      Session Length: 38 - 52      Attendees: Patient     Service Modality:  In-person    Visit Activities (Refresh list every visit): NEW and [facility] Only    Diagnostic Assessment Date: Next Session  Treatment Plan Review Date: NA  See Flowsheets for today's PHQ-9 and GIOVANNA-7 results  Previous PHQ-9:   PHQ-9 SCORE 7/15/2020 4/19/2022 5/2/2022   PHQ-9 Total Score MyChart - - 10 (Moderate depression)   PHQ-9 Total Score 8 13 10     Previous GIOVANNA-7:   GIOVANNA-7 SCORE 7/15/2020 5/2/2022   Total Score - 11 (moderate anxiety)   Total Score 11 11       SUSANA LEVEL:  No flowsheet data found.    DATA  Extended Session (60+ minutes): No  Interactive Complexity: No  Crisis: No  [facility] Patient: No    Treatment Objective(s) Addressed in This Session:  Target Behavior(s): disease management/lifestyle changes related to mental health    Depressed Mood: Increase interest, engagement, and pleasure in doing things  Decrease frequency and intensity of feeling down, depressed, hopeless  Improve quantity and quality of night time sleep / decrease daytime naps  Feel less tired and more energy during the day   Improve diet, appetite, mindful eating, and / or meal planning  Identify negative self-talk and behaviors: challenge core beliefs, myths, and actions  Improve concentration, focus, and mindfulness in daily activities   Feel less fidgety, restless or slow in daily activities / interpersonal interactions  Anxiety: will experience a reduction in anxiety, will develop more effective coping skills to manage anxiety symptoms, will develop healthy cognitive patterns and beliefs and will increase ability to function adaptively  Adjustment  "Difficulties: will develop coping/problem-solving skills to facilitate more adaptive adjustment  Relationship Problems: will address relationship difficulties in a more adaptive manner  Grief / Loss: will increase understanding of normal grieving process, will engage in effective approach to address and resolve grief/loss issues and will process grief/loss issues in an adaptive manner    Current Stressors / Issues:  [facility] introduced self and role to patient. Patient reported that she has been \"losing motivation and energy\". Patient reported that her son received a diagnosis 2.5 years ago that has been difficult to cope with since. Around that same time, patient's friend/coworker committed suicide, as well as they had just built their \"dream house\", but had to move and rebuild to better accommodate patient's son's diagnosis. Covid-19 was also an added stressor. Patient reported that she has been having a hard time adjusting to all of these changes and is grieving the things she expected her life was going to look like. Patient has noticed that she has been drinking a lot as a way to cope, which she knows is not right. She also has had an increase in depression, anxiety, low sex drive, and low energy. Patient reported that she feels like she is coping with all of these stressors on her own and she does not have people to talk to. Patient's  is very different in how he mehnaz and does not quite understand what patient is going through. There is also tension in their relationship around sex and other things. Patient experiences a lot of guilt. [facility] and patient spent session processing through patient's symptoms and the stressors she has been experiencing. [facility] provided patient with some strategies she can begin to utilize, as well as, a list of agencies for patient to connect with to see if she get get on a waitlist to see a therapist.       Progress on Treatment Objective(s) / Homework:  New Objective established " this session - PREPARATION (Decided to change - considering how); Intervened by negotiating a change plan and determining options / strategies for behavior change, identifying triggers, exploring social supports, and working towards setting a date to begin behavior change    Motivational Interviewing    MI Intervention: Expressed Empathy/Understanding, Supported Autonomy, Collaboration, Evocation, Permission to raise concern or advise, Open-ended questions and Reflections: simple and complex     Change Talk Expressed by the Patient: Desire to change Ability to change Reasons to change Need to change    Provider Response to Change Talk: E - Evoked more info from patient about behavior change, A - Affirmed patient's thoughts, decisions, or attempts at behavior change, R - Reflected patient's change talk and S - Summarized patient's change talk statements      Situation        Automatic Thoughts  Cognitive Distortions      Feelings        Behavior        Questioning Thoughts            Also provided psychoeducation about behavioral health condition, symptoms, and treatment options    Care Plan review completed: Yes    Medication Review:  No changes to current psychiatric medication(s)    Medication Compliance:  Yes    Changes in Health Issues:   None reported    Chemical Use Review:   Substance Use: increase in alcohol .  Patient reports frequency of use daily.  Provided encouragement towards sobriety        Tobacco Use: No current tobacco use.      Assessment: Current Emotional / Mental Status (status of significant symptoms):  Risk status (Self / Other harm or suicidal ideation)  Patient denies a history of suicidal ideation, suicide attempts, self-injurious behavior, homicidal ideation, homicidal behavior and and other safety concerns  Patient denies current fears or concerns for personal safety.  Patient denies current or recent suicidal ideation or behaviors.  Patient denies current or recent homicidal ideation or  behaviors.  Patient denies current or recent self injurious behavior or ideation.  Patient denies other safety concerns.  A safety and risk management plan has not been developed at this time, however patient was encouraged to call [place] [phone] should there be a change in any of these risk factors.    Appearance:   Appropriate   Eye Contact:   Good   Psychomotor Behavior: Normal   Attitude:   Cooperative   Orientation:   All  Speech   Rate / Production: Normal    Volume:  Normal   Mood:    Anxious  Normal Sad   Affect:    Appropriate   Thought Content:  Clear   Thought Form:  Coherent  Logical   Insight:    Good     Diagnoses:  1. Adjustment disorder with mixed anxiety and depressed mood        Collateral Reports Completed:  Routed note to PCP    Plan: (Homework, other):  Patient was given information about behavioral services and encouraged to schedule a follow up appointment with the clinic [facility] as needed.  She was also given information about mental health symptoms and treatment options .  CD Recommendations: No indications of CD issues. DA to be completed at next session. MIKKI Haley, [facility]      ______________________________________________________________________

## 2022-05-16 ENCOUNTER — OFFICE VISIT (OUTPATIENT)
Dept: BEHAVIORAL HEALTH | Facility: CLINIC | Age: 37
End: 2022-05-16
Payer: COMMERCIAL

## 2022-05-16 DIAGNOSIS — F43.23 ADJUSTMENT DISORDER WITH MIXED ANXIETY AND DEPRESSED MOOD: Primary | ICD-10-CM

## 2022-05-16 PROCEDURE — 90832 PSYTX W PT 30 MINUTES: CPT | Performed by: SOCIAL WORKER

## 2022-05-16 NOTE — PROGRESS NOTES
[facility] Primary Care: : Integrated Behavioral Health  May 16, 2022    Behavioral Health Clinician Progress Note    Patient Name: Shruthi Rogers           Service Type:  Individual      Service Location:   Face to Face in Clinic     Session Start Time: 10:55am  Session End Time: 11:15am      Session Length: 38 - 52      Attendees: Patient     Service Modality:  In-person    Visit Activities (Refresh list every visit): [facility] Only    Diagnostic Assessment Date: Next Session  Treatment Plan Review Date: NA  See Flowsheets for today's PHQ-9 and GIOVANNA-7 results  Previous PHQ-9:   PHQ-9 SCORE 7/15/2020 4/19/2022 5/2/2022   PHQ-9 Total Score MyChart - - 10 (Moderate depression)   PHQ-9 Total Score 8 13 10     Previous GIOVANNA-7:   GIOVANNA-7 SCORE 7/15/2020 5/2/2022   Total Score - 11 (moderate anxiety)   Total Score 11 11       SUSANA LEVEL:  No flowsheet data found.    DATA  Extended Session (60+ minutes): No  Interactive Complexity: No  Crisis: No  [facility] Patient: No    Treatment Objective(s) Addressed in This Session:  Target Behavior(s): disease management/lifestyle changes related to mental health    Depressed Mood: Increase interest, engagement, and pleasure in doing things  Decrease frequency and intensity of feeling down, depressed, hopeless  Improve quantity and quality of night time sleep / decrease daytime naps  Feel less tired and more energy during the day   Improve diet, appetite, mindful eating, and / or meal planning  Identify negative self-talk and behaviors: challenge core beliefs, myths, and actions  Improve concentration, focus, and mindfulness in daily activities   Feel less fidgety, restless or slow in daily activities / interpersonal interactions  Anxiety: will experience a reduction in anxiety, will develop more effective coping skills to manage anxiety symptoms, will develop healthy cognitive patterns and beliefs and will increase ability to function adaptively  Adjustment Difficulties:  "will develop coping/problem-solving skills to facilitate more adaptive adjustment  Relationship Problems: will address relationship difficulties in a more adaptive manner  Grief / Loss: will increase understanding of normal grieving process, will engage in effective approach to address and resolve grief/loss issues and will process grief/loss issues in an adaptive manner    Current Stressors / Issues:  Patient reported that she is \"doing well\". She reported that she thinks the weather has been super helpful for her mood. She found that after the last session she has been in a much better place. She thinks it was helpful to verbalize everything and get some suggestions on what she can do. She bought a journal, but hasn't used it yet. Her  offered to play with the kiddos tonight, so she can take some time for herself. She also has had some helpful and positive conversations with her . She also got connected to a family that has a 15 year old with the same diagnosis as their son and she spent time talking with her over the weekend, which was very helpful for her. Patient feels like she is on a good path and that she will continue with long-term therapy if she feels she would benefit.     Progress on Treatment Objective(s) / Homework:  Satisfactory progress - ACTION (Actively working towards change); Intervened by reinforcing change plan / affirming steps taken     Motivational Interviewing    MI Intervention: Expressed Empathy/Understanding, Supported Autonomy, Collaboration, Evocation, Permission to raise concern or advise, Open-ended questions and Reflections: simple and complex     Change Talk Expressed by the Patient: Desire to change Ability to change Reasons to change Need to change    Provider Response to Change Talk: E - Evoked more info from patient about behavior change, A - Affirmed patient's thoughts, decisions, or attempts at behavior change, R - Reflected patient's change talk and S - " Summarized patient's change talk statements      Situation        Automatic Thoughts  Cognitive Distortions      Feelings        Behavior        Questioning Thoughts            Also provided psychoeducation about behavioral health condition, symptoms, and treatment options    Care Plan review completed: Yes    Medication Review:  No changes to current psychiatric medication(s)    Medication Compliance:  Yes    Changes in Health Issues:   None reported    Chemical Use Review:   Substance Use: increase in alcohol .  Patient reports frequency of use daily.  Provided encouragement towards sobriety        Tobacco Use: No current tobacco use.      Assessment: Current Emotional / Mental Status (status of significant symptoms):  Risk status (Self / Other harm or suicidal ideation)  Patient denies a history of suicidal ideation, suicide attempts, self-injurious behavior, homicidal ideation, homicidal behavior and and other safety concerns  Patient denies current fears or concerns for personal safety.  Patient denies current or recent suicidal ideation or behaviors.  Patient denies current or recent homicidal ideation or behaviors.  Patient denies current or recent self injurious behavior or ideation.  Patient denies other safety concerns.  A safety and risk management plan has not been developed at this time, however patient was encouraged to call [place] [phone] should there be a change in any of these risk factors.    Appearance:   Appropriate   Eye Contact:   Good   Psychomotor Behavior: Normal   Attitude:   Cooperative   Orientation:   All  Speech   Rate / Production: Normal    Volume:  Normal   Mood:    Anxious  Normal Sad   Affect:    Appropriate   Thought Content:  Clear   Thought Form:  Coherent  Logical   Insight:    Good     Diagnoses:  1. Adjustment disorder with mixed anxiety and depressed mood        Collateral Reports Completed:  Not Applicable    Plan: (Homework, other):  Patient was given information  about behavioral services and encouraged to schedule a follow up appointment with the clinic [facility] [place] as needed.  She was also given information about mental health symptoms and treatment options .  CD Recommendations: No indications of CD issues. DA to be completed at next session. MIKKI Haley, [facility] [place]      ______________________________________________________________________

## 2022-06-07 DIAGNOSIS — F41.9 ANXIETY AND DEPRESSION: ICD-10-CM

## 2022-06-07 DIAGNOSIS — F32.A ANXIETY AND DEPRESSION: ICD-10-CM

## 2022-06-10 NOTE — TELEPHONE ENCOUNTER
Routing refill request to provider for review/approval because:  Sertraline was new prescription on 4/19/22 with directions to follow up in one month. No f/u visit. Please advise on refill request.   Pt is seeing behavior health for adjustment disorder with mixed anxiety, depressed mood.    Last Written Prescription Date: 4/19/22  Last Fill Quantity: 30,  # refills: 1   Last office visit:  4/19/22 depression TFS  PHQ-9 completed 4/19/22.

## 2022-06-10 NOTE — TELEPHONE ENCOUNTER
TC, please call pt and invite pt to schedule depression f/u visit with TFS. May be virtual.  Thank you

## 2022-06-15 ASSESSMENT — PATIENT HEALTH QUESTIONNAIRE - PHQ9
10. IF YOU CHECKED OFF ANY PROBLEMS, HOW DIFFICULT HAVE THESE PROBLEMS MADE IT FOR YOU TO DO YOUR WORK, TAKE CARE OF THINGS AT HOME, OR GET ALONG WITH OTHER PEOPLE: NOT DIFFICULT AT ALL
SUM OF ALL RESPONSES TO PHQ QUESTIONS 1-9: 2
SUM OF ALL RESPONSES TO PHQ QUESTIONS 1-9: 2

## 2022-06-15 ASSESSMENT — ANXIETY QUESTIONNAIRES
5. BEING SO RESTLESS THAT IT IS HARD TO SIT STILL: SEVERAL DAYS
4. TROUBLE RELAXING: SEVERAL DAYS
7. FEELING AFRAID AS IF SOMETHING AWFUL MIGHT HAPPEN: NOT AT ALL
3. WORRYING TOO MUCH ABOUT DIFFERENT THINGS: SEVERAL DAYS
2. NOT BEING ABLE TO STOP OR CONTROL WORRYING: NOT AT ALL
GAD7 TOTAL SCORE: 5
6. BECOMING EASILY ANNOYED OR IRRITABLE: SEVERAL DAYS
1. FEELING NERVOUS, ANXIOUS, OR ON EDGE: SEVERAL DAYS

## 2022-06-28 ENCOUNTER — OFFICE VISIT (OUTPATIENT)
Dept: FAMILY MEDICINE | Facility: CLINIC | Age: 37
End: 2022-06-28
Payer: COMMERCIAL

## 2022-06-28 VITALS
BODY MASS INDEX: 26.95 KG/M2 | SYSTOLIC BLOOD PRESSURE: 118 MMHG | WEIGHT: 167 LBS | HEART RATE: 60 BPM | DIASTOLIC BLOOD PRESSURE: 76 MMHG

## 2022-06-28 DIAGNOSIS — F41.9 ANXIETY AND DEPRESSION: ICD-10-CM

## 2022-06-28 DIAGNOSIS — F32.A ANXIETY AND DEPRESSION: ICD-10-CM

## 2022-06-28 PROCEDURE — 99213 OFFICE O/P EST LOW 20 MIN: CPT | Performed by: FAMILY MEDICINE

## 2022-06-28 ASSESSMENT — ANXIETY QUESTIONNAIRES
5. BEING SO RESTLESS THAT IT IS HARD TO SIT STILL: SEVERAL DAYS
1. FEELING NERVOUS, ANXIOUS, OR ON EDGE: SEVERAL DAYS
2. NOT BEING ABLE TO STOP OR CONTROL WORRYING: NOT AT ALL
6. BECOMING EASILY ANNOYED OR IRRITABLE: SEVERAL DAYS
GAD7 TOTAL SCORE: 5
7. FEELING AFRAID AS IF SOMETHING AWFUL MIGHT HAPPEN: NOT AT ALL
8. IF YOU CHECKED OFF ANY PROBLEMS, HOW DIFFICULT HAVE THESE MADE IT FOR YOU TO DO YOUR WORK, TAKE CARE OF THINGS AT HOME, OR GET ALONG WITH OTHER PEOPLE?: NOT DIFFICULT AT ALL
GAD7 TOTAL SCORE: 5
4. TROUBLE RELAXING: SEVERAL DAYS
7. FEELING AFRAID AS IF SOMETHING AWFUL MIGHT HAPPEN: NOT AT ALL
3. WORRYING TOO MUCH ABOUT DIFFERENT THINGS: SEVERAL DAYS
GAD7 TOTAL SCORE: 5

## 2022-06-28 ASSESSMENT — PATIENT HEALTH QUESTIONNAIRE - PHQ9
10. IF YOU CHECKED OFF ANY PROBLEMS, HOW DIFFICULT HAVE THESE PROBLEMS MADE IT FOR YOU TO DO YOUR WORK, TAKE CARE OF THINGS AT HOME, OR GET ALONG WITH OTHER PEOPLE: NOT DIFFICULT AT ALL
SUM OF ALL RESPONSES TO PHQ QUESTIONS 1-9: 2

## 2022-06-28 NOTE — PROGRESS NOTES
Assessment & Plan     Anxiety and depression  Overall doing well very pleased with results follow-up with us in a year  - sertraline (ZOLOFT) 50 MG tablet; Take 1 tablet (50 mg) by mouth daily      25 minutes spent on the date of the encounter doing chart review, history and exam, documentation and further activities per the note           Return in about 1 year (around 6/28/2023) for Follow up.    Gerardo Freeman MD  [facility] [place]    Gennaro Hawkins is a 36 year old, presenting for the following health issues: Overall doing well.   and children are doing well.  She has cut back on her alcohol intake.  Happy with the Zoloft does not want to change in plans of pregnancy    History of Present Illness       Mental Health Follow-up:  Patient presents to follow-up on Depression & Anxiety.Patient's depression since last visit has been:  Better  The patient is not having other symptoms associated with depression.  Patient's anxiety since last visit has been:  Better  The patient is not having other symptoms associated with anxiety.  Any significant life events: health concerns  Patient is not feeling anxious or having panic attacks.  Patient has no concerns about alcohol or drug use.    She eats 2-3 servings of fruits and vegetables daily.She consumes 2 sweetened beverage(s) daily.She exercises with enough effort to increase her heart rate 9 or less minutes per day.  She exercises with enough effort to increase her heart rate 3 or less days per week.   She is taking medications regularly.    Today's PHQ-9         PHQ-9 Total Score: 2    PHQ-9 Q9 Thoughts of better off dead/self-harm past 2 weeks :   Not at all    How difficult have these problems made it for you to do your work, take care of things at home, or get along with other people: Not difficult at all  Today's GIOVANNA-7 Score: 5             Review of Systems   Constitutional, HEENT, cardiovascular, pulmonary, gi and gu systems  are negative, except as otherwise noted.      Objective    /76 (BP Location: Right arm, Patient Position: Sitting, Cuff Size: Adult Large)   Pulse 60   Wt 75.8 kg (167 lb)   BMI 26.95 kg/m    Body mass index is 26.95 kg/m .  Physical Exam   Alert pleasant no distress                    .  ..

## 2022-08-03 ENCOUNTER — OFFICE VISIT (OUTPATIENT)
Dept: FAMILY MEDICINE | Facility: CLINIC | Age: 37
End: 2022-08-03
Payer: COMMERCIAL

## 2022-08-03 ENCOUNTER — ANCILLARY PROCEDURE (OUTPATIENT)
Dept: GENERAL RADIOLOGY | Facility: CLINIC | Age: 37
End: 2022-08-03
Attending: PHYSICIAN ASSISTANT
Payer: COMMERCIAL

## 2022-08-03 VITALS
SYSTOLIC BLOOD PRESSURE: 130 MMHG | TEMPERATURE: 98.5 F | RESPIRATION RATE: 20 BRPM | BODY MASS INDEX: 28.12 KG/M2 | WEIGHT: 175 LBS | DIASTOLIC BLOOD PRESSURE: 88 MMHG | HEART RATE: 72 BPM | HEIGHT: 66 IN | OXYGEN SATURATION: 98 %

## 2022-08-03 DIAGNOSIS — M54.2 NECK PAIN: Primary | ICD-10-CM

## 2022-08-03 DIAGNOSIS — R07.89 CHEST WALL DISCOMFORT: ICD-10-CM

## 2022-08-03 PROBLEM — S61.419A HAND LACERATION: Status: ACTIVE | Noted: 2022-07-16

## 2022-08-03 PROCEDURE — 99214 OFFICE O/P EST MOD 30 MIN: CPT | Performed by: PHYSICIAN ASSISTANT

## 2022-08-03 PROCEDURE — 71046 X-RAY EXAM CHEST 2 VIEWS: CPT | Mod: TC | Performed by: RADIOLOGY

## 2022-08-03 RX ORDER — METHYLPREDNISOLONE 4 MG
TABLET, DOSE PACK ORAL
Qty: 21 TABLET | Refills: 0 | Status: SHIPPED | OUTPATIENT
Start: 2022-08-03 | End: 2022-08-22

## 2022-08-03 RX ORDER — METHOCARBAMOL 500 MG/1
500-1000 TABLET, FILM COATED ORAL 3 TIMES DAILY
Qty: 30 TABLET | Refills: 1 | Status: SHIPPED | OUTPATIENT
Start: 2022-08-03 | End: 2022-10-24

## 2022-08-03 ASSESSMENT — ENCOUNTER SYMPTOMS
BACK PAIN: 1
COUGH: 1

## 2022-08-03 NOTE — PATIENT INSTRUCTIONS
Try the medrol and robaxin as ordered.    Ice, heat, continue chiropractor.    Follow up for persistent or worsening symptoms.

## 2022-08-03 NOTE — PROGRESS NOTES
Assessment & Plan     Neck pain  No neurologic deficits.  Pain radiating to the L scapula likely radicular. Trial of medrol and muscle relaxant.      Chest wall discomfort  Posterior medial chest wall discomfort, medial scapular area.  She has no sob, difficulty breathing but does have pleuritic discomfort. The CXR is unremarkable and normal sats, no tachypnea.  PERC score=0.  Low risk for PE.  This is most consistent with musculoskeletal tand likely stemmng from her recent cervical pain.   Trial of robaxin as ordered.    Short course of medrol for cervical radiculpathy  - XR Chest 2 Views  - methylPREDNISolone (MEDROL DOSEPAK) 4 MG tablet therapy pack  Dispense: 21 tablet; Refill: 0  - methocarbamol (ROBAXIN) 500 MG tablet  Dispense: 30 tablet; Refill: 1       SHARYN Garcia [employer]    Gennaro Hawkins is a 36 year old female who presents to clinic today for the following health issues:  Chief Complaint   Patient presents with     Back Pain     Upper left back pain that started last night.     Cough     And SOB x 2 days       Perc rule =0    Back Pain     Cough    History of Present Illness       Back Pain:  She presents for follow up of back pain. Patient's back pain is a new problem.    Original cause of back pain: not sure  First noticed back pain: in the last week  Patient feels back pain: constantlyLocation of back pain:  Left middle of back  Description of back pain: dull ache, sharp and stabbing  Back pain spreads: left shoulder    Since patient first noticed back pain, pain is: rapidly worsening  Does back pain interfere with her job:  No  On a scale of 1-10 (10 being the worst), patient describes pain as:  6  What makes back pain worse: bending, coughing, certain positions, lying down, sitting, standing and twisting  Acupuncture: not tried  Acetaminophen: not helpful  Activity or exercise: not helpful  Chiropractor:  Helpful  Cold: not tried  Heat: not  "tried  Massage: not helpful  Muscle relaxants: not tried  NSAIDS: not helpful  Opioids: not tried  Physical Therapy: not tried  Rest: not helpful  Steroid Injection: not tried  Stretching: not helpful  Surgery: not tried  TENS unit: not tried  Topical pain relievers: not tried  Other healthcare providers patient is seeing for back pain: Chiropractor    She eats 0-1 servings of fruits and vegetables daily.She consumes 2 sweetened beverage(s) daily.She exercises with enough effort to increase her heart rate 9 or less minutes per day.  She exercises with enough effort to increase her heart rate 3 or less days per week.   She is taking medications regularly.      Uri x 2 weeks resolved. Typical cold, cough improving. No covid 19 test.  Has had covid in the last 2 months.   No fevers.    No chest pain anteriorly.    No nausea vomiting,   Ibuprofen for \"nerve\" pain in the neck feeling like it now is radiating down to the scaular area.  No current cough, sob, difficulty breathing      Review of Systems   Respiratory: Positive for cough.    Musculoskeletal: Positive for back pain.           Objective    /88 (BP Location: Right arm, Patient Position: Sitting, Cuff Size: Adult Regular)   Pulse 72   Temp 98.5  F (36.9  C) (Tympanic)   Resp 20   Ht 1.676 m (5' 6\")   Wt 79.4 kg (175 lb)   SpO2 98%   BMI 28.25 kg/m    Physical Exam   Pt is in no acute distress and appears well  Ears patent B:  TM s intact, non-injected. All land marks easily visibile    Nasal mucosa is non-edematous, no discharge.    Pharynx: non erythematous, tonsils non hypertrophied, No exudate   Neck supple: no adenopathy  Lungs: CTA  Heart: RRR, no murmur, no thrills or heaves   Ext: no edema  Skin: no rashes    Neck: pain with palpation upper trap to medial scapular boarder.    Full AROM at the c spine but pain with lateral flexion to the L, rotation to the L and flexion.    Muscular strength ,sesnation and DTR are symetrical and WNL for UE. " B    Results for orders placed or performed in visit on 08/03/22   XR Chest 2 Views     Status: None    Narrative    EXAM: XR CHEST 2 VIEWS  LOCATION: [facility]  DATE/TIME: 8/3/2022 11:16 AM    INDICATION:  Chest wall discomfort  COMPARISON: None.      Impression    IMPRESSION: Negative chest.

## 2022-08-22 ENCOUNTER — OFFICE VISIT (OUTPATIENT)
Dept: FAMILY MEDICINE | Facility: CLINIC | Age: 37
End: 2022-08-22
Payer: COMMERCIAL

## 2022-08-22 VITALS
SYSTOLIC BLOOD PRESSURE: 120 MMHG | TEMPERATURE: 98 F | HEART RATE: 68 BPM | WEIGHT: 180.3 LBS | OXYGEN SATURATION: 99 % | DIASTOLIC BLOOD PRESSURE: 78 MMHG | BODY MASS INDEX: 29.1 KG/M2

## 2022-08-22 DIAGNOSIS — M25.562 ACUTE PAIN OF LEFT KNEE: Primary | ICD-10-CM

## 2022-08-22 PROCEDURE — 99213 OFFICE O/P EST LOW 20 MIN: CPT | Performed by: FAMILY MEDICINE

## 2022-08-22 NOTE — PROGRESS NOTES
Clinical Decision Making:    At the end of the encounter, I discussed results, diagnosis, medications. Discussed red flags for immediate return to clinic/ER, as well as indications for follow up if no improvement. Patient understood and agreed to plan. Patient was stable for discharge.      ICD-10-CM    1. Acute pain of left knee  M25.562 XR Knee Left 3 Views     Knee Supplies Order for DME - ONLY FOR DME     Orthopedic  Referral     Crutches Order for DME - ONLY FOR DME     X-ray negative for fracture  Patient placed in straight leg brace and crutches provided  Advised nonweightbearing  Tylenol and ibuprofen as needed  Ice frequently  Referral done for orthopedics      There are no Patient Instructions on file for this visit.   No follow-ups on file.      chief complaint    HPI:  Shruthi Rogers is a 36 year old female who presents today complaining of left knee pain since yesterday.  She was kayaking and when they got out of the kayak her left foot got stuck in the mud and her left knee twisted.  She heard a ripping and noise.  The knee has been swollen and painful ever since.  It feels unstable.    She has had knee surgery on this left knee in the past.  2 ACL repairs and a meniscus repair.    About a month ago she was in with back pain and treated with a Medrol Dosepak and Robaxin muscle relaxer.  She also saw chiropractor and her back symptoms have improved.    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    History obtained from the patient.    Problem List:  2022-07: Hand laceration  2022-04: Anxiety state  2020-07: Adjustment disorder with anxiety      Past Medical History:   Diagnosis Date     Infection due to 2019 novel coronavirus 03/2022       Social History     Tobacco Use     Smoking status: Former Smoker     Types: Cigarettes     Smokeless tobacco: Current User     Types: Chew   Substance Use Topics     Alcohol use: Not on file       Review of systems  negative except  listed in HPI    Vitals:    08/22/22 1137   BP: 120/78   BP Location: Right arm   Patient Position: Sitting   Pulse: 68   Temp: 98  F (36.7  C)   SpO2: 99%   Weight: 81.8 kg (180 lb 4.8 oz)       Physical Exam  Vitals noted and within normal limits  In general she is alert, oriented, and in no acute distress  Left knee with moderate effusion.  There is no warmth or erythema.  There is decreased range of motion and positive tenderness.  Did not assess ligaments at this time because of moderate swelling and will be referring to orthopedics anyway  X-ray shows no fractures  Results for orders placed or performed in visit on 08/22/22   XR Knee Left 3 Views     Status: None    Narrative    EXAM: XR KNEE LEFT 3 VIEWS  LOCATION: [facility]  DATE/TIME: 8/22/2022 12:20 PM    INDICATION:  Acute pain of left knee  COMPARISON: None.      Impression    IMPRESSION: Postoperative changes ligamentous reconstruction. No evidence for fracture. Moderate effusion. No evidence for fracture.             Answers for HPI/ROS submitted by the patient on 8/22/2022  How many servings of fruits and vegetables do you eat daily?: 2-3  On average, how many sweetened beverages do you drink each day (Examples: soda, juice, sweet tea, etc.  Do NOT count diet or artificially sweetened beverages)?: 2  How many minutes a day do you exercise enough to make your heart beat faster?: 9 or less  How many days a week do you exercise enough to make your heart beat faster?: 3 or less  How many days per week do you miss taking your medication?: 0  What is the reason for your visit today?: Knee injury, swollen, painful  When did your symptoms begin?: 1-3 days ago  What are your symptoms?: Swollen,painful, difficulty walking or bending knee  How would you describe these symptoms?: Moderate  Are your symptoms:: Worsening  Have you had these symptoms before?: Yes  Have you tried or received treatment for these symptoms before?: Yes  Did  that treatment work? : Yes  Please describe the treatment you had:: 3 knee surgery, pt,  Is there anything that makes you feel worse?: Bending, walking  Is there anything that makes you feel better?: Ice

## 2022-08-22 NOTE — PATIENT INSTRUCTIONS
Acetaminophen (Tylenol) 500mg tablets.  You may take 2 tabs every 4-6 hours as needed  Ibuprofen (Advil, Motrin) 200mg tablets.  You may take 3 tabs every 6-8 hours as needed with food.    Ice a few times per day    Crutches    Wear straight leg brace at all times

## 2022-09-25 ENCOUNTER — HEALTH MAINTENANCE LETTER (OUTPATIENT)
Age: 37
End: 2022-09-25

## 2022-10-04 ENCOUNTER — TRANSFERRED RECORDS (OUTPATIENT)
Dept: HEALTH INFORMATION MANAGEMENT | Facility: CLINIC | Age: 37
End: 2022-10-04

## 2022-10-24 ENCOUNTER — OFFICE VISIT (OUTPATIENT)
Dept: FAMILY MEDICINE | Facility: CLINIC | Age: 37
End: 2022-10-24
Payer: COMMERCIAL

## 2022-10-24 VITALS
WEIGHT: 186.6 LBS | SYSTOLIC BLOOD PRESSURE: 119 MMHG | BODY MASS INDEX: 29.99 KG/M2 | HEIGHT: 66 IN | TEMPERATURE: 98.4 F | HEART RATE: 60 BPM | DIASTOLIC BLOOD PRESSURE: 82 MMHG

## 2022-10-24 DIAGNOSIS — T84.89XD TEAR OF ANTERIOR CRUCIATE LIGAMENT GRAFT, SUBSEQUENT ENCOUNTER: ICD-10-CM

## 2022-10-24 DIAGNOSIS — Z01.818 PREOPERATIVE EXAMINATION: Primary | ICD-10-CM

## 2022-10-24 LAB — HCG UR QL: NEGATIVE

## 2022-10-24 PROCEDURE — 99214 OFFICE O/P EST MOD 30 MIN: CPT | Performed by: FAMILY MEDICINE

## 2022-10-24 PROCEDURE — 81025 URINE PREGNANCY TEST: CPT | Performed by: FAMILY MEDICINE

## 2022-10-24 ASSESSMENT — LIFESTYLE VARIABLES
SKIP TO QUESTIONS 9-10: 1
AUDIT-C TOTAL SCORE: 4
HOW OFTEN DO YOU HAVE SIX OR MORE DRINKS ON ONE OCCASION: NEVER
HOW MANY STANDARD DRINKS CONTAINING ALCOHOL DO YOU HAVE ON A TYPICAL DAY: 1 OR 2
HOW OFTEN DO YOU HAVE A DRINK CONTAINING ALCOHOL: 4 OR MORE TIMES A WEEK

## 2022-10-24 NOTE — PROGRESS NOTES
[address] [address]  Phone: [phone]  Fax: [phone]  Primary Provider: Gerardo Freeman  Pre-op Performing Provider: LILLI LYNN      PREOPERATIVE EVALUATION:  Today's date: 10/24/2022    Shruthi Rogers is a 37 year old female who presents for a preoperative evaluation.    Surgical Information:  Surgery/Procedure: left knee arthroscopy  Surgery Location: [facility]  Surgeon: Dr. Spangler  Surgery Date: 10/31/2022  Time of Surgery: TBD  Where patient plans to recover: At home with family  Fax number for surgical facility: [phone]    Type of Anesthesia Anticipated: General    Assessment & Plan     The proposed surgical procedure is considered LOW risk.    Preoperative examination      Tear of anterior cruciate ligament graft, subsequent encounter        Risks and Recommendations:  The patient has the following additional risks and recommendations for perioperative complications:   - No identified additional risk factors other than previously addressed    Medication Instructions:  Patient is to take all scheduled medications on the day of surgery    RECOMMENDATION:  APPROVAL GIVEN to proceed with proposed procedure, without further diagnostic evaluation.                      Subjective     HPI related to upcoming procedure: left knee arthroscopy    Preop Questions 10/24/2022   1. Have you ever had a heart attack or stroke? No   2. Have you ever had surgery on your heart or blood vessels, such as a stent placement, a coronary artery bypass, or surgery on an artery in your head, neck, heart, or legs? No   3. Do you have chest pain with activity? No   4. Do you have a history of  heart failure? No   5. Do you currently have a cold, bronchitis or symptoms of other infection? No   6. Do you have a cough, shortness of breath, or wheezing? No   7. Do you or anyone in your family have previous history of blood  clots? No   8. Do you or does anyone in your family have a serious bleeding problem such as prolonged bleeding following surgeries or cuts? No   9. Have you ever had problems with anemia or been told to take iron pills? No   10. Have you had any abnormal blood loss such as black, tarry or bloody stools, or abnormal vaginal bleeding? No   11. Have you ever had a blood transfusion? No   12. Are you willing to have a blood transfusion if it is medically needed before, during, or after your surgery? Yes   13. Have you or any of your relatives ever had problems with anesthesia? No   14. Do you have sleep apnea, excessive snoring or daytime drowsiness? No   15. Do you have any artifical heart valves or other implanted medical devices like a pacemaker, defibrillator, or continuous glucose monitor? No   16. Do you have artificial joints? No   17. Are you allergic to latex? No   18. Is there any chance that you may be pregnant? No       Health Care Directive:  Patient does not have a Health Care Directive or Living Will: Discussed advance care planning with patient; information given to patient to review.    Preoperative Review of :   reviewed - no record of controlled substances prescribed.          Review of Systems  CONSTITUTIONAL: NEGATIVE for fever, chills, change in weight  INTEGUMENTARY/SKIN: NEGATIVE for worrisome rashes, moles or lesions  EYES: NEGATIVE for vision changes or irritation  ENT/MOUTH: NEGATIVE for ear, mouth and throat problems  RESP: NEGATIVE for significant cough or SOB  CV: NEGATIVE for chest pain, palpitations or peripheral edema  GI: NEGATIVE for nausea, abdominal pain, heartburn, or change in bowel habits  : NEGATIVE for frequency, dysuria, or hematuria  MUSCULOSKELETAL: NEGATIVE for significant arthralgias or myalgia  NEURO: NEGATIVE for weakness, dizziness or paresthesias  ENDOCRINE: NEGATIVE for temperature intolerance, skin/hair changes  HEME: NEGATIVE for bleeding  "problems  PSYCHIATRIC: NEGATIVE for changes in mood or affect    Patient Active Problem List    Diagnosis Date Noted     Hand laceration 07/16/2022     Priority: Medium     Anxiety state 04/19/2022     Priority: Medium      Past Medical History:   Diagnosis Date     Infection due to 2019 novel coronavirus 03/2022     Past Surgical History:   Procedure Laterality Date     ARTHROSCOPIC REPAIR ACL Left     no date     ARTHROSCOPY KNEE WITH MENISCAL REPAIR Left 10/16/2017     CYSTOSCOPY  11/15/2011     UMBILICAL HERNIA REPAIR  1993     Current Outpatient Medications   Medication Sig Dispense Refill     Multiple Vitamins-Minerals (DAILY MULTI PO) Take by mouth daily       sertraline (ZOLOFT) 50 MG tablet Take 1 tablet (50 mg) by mouth daily 90 tablet 3     methocarbamol (ROBAXIN) 500 MG tablet Take 1-2 tablets (500-1,000 mg) by mouth 3 times daily 30 tablet 1       No Known Allergies     Social History     Tobacco Use     Smoking status: Former     Types: Cigarettes     Smokeless tobacco: Current     Types: Chew   Substance Use Topics     Alcohol use: Not on file     Comment: couple drinks daily with dinner     Family History   Problem Relation Age of Onset     No Known Problems Sister         age: 38 years     No Known Problems Sister         age: 40 years     No Known Problems Brother         age\" 32 years     No Known Problems Daughter         age: 3 years     No Known Problems Son         age: 6 years     History   Drug Use Unknown         Objective     /82 (BP Location: Right arm, Patient Position: Sitting, Cuff Size: Adult Large)   Pulse 60   Temp 98.4  F (36.9  C) (Tympanic)   Ht 1.676 m (5' 6\")   Wt 84.6 kg (186 lb 9.6 oz)   BMI 30.12 kg/m      Physical Exam    GENERAL APPEARANCE: healthy, alert and no distress     EYES: EOMI, PERRL     HENT: ear canals and TM's normal and nose and mouth without ulcers or lesions     NECK: no adenopathy, no asymmetry, masses, or scars and thyroid normal to palpation    "  RESP: lungs clear to auscultation - no rales, rhonchi or wheezes     CV: regular rates and rhythm, normal S1 S2, no S3 or S4 and no murmur, click or rub     ABDOMEN:  soft, nontender, no HSM or masses and bowel sounds normal     MS: extremities normal- no gross deformities noted, no evidence of inflammation in joints, FROM in all extremities.     SKIN: no suspicious lesions or rashes     NEURO: Normal strength and tone, sensory exam grossly normal, mentation intact and speech normal     PSYCH: mentation appears normal. and affect normal/bright     LYMPHATICS: No cervical adenopathy       Diagnostics:  UPT: negative  No EKG required, no history of coronary heart disease, significant arrhythmia, peripheral arterial disease or other structural heart disease.    Revised Cardiac Risk Index (RCRI):  The patient has the following serious cardiovascular risks for perioperative complications:   - No serious cardiac risks = 0 points     RCRI Interpretation: 0 points: Class I (very low risk - 0.4% complication rate)           Signed Electronically by: Foreign Faustin MD  Copy of this evaluation report is provided to requesting physician.

## 2022-10-31 ENCOUNTER — TRANSFERRED RECORDS (OUTPATIENT)
Dept: HEALTH INFORMATION MANAGEMENT | Facility: CLINIC | Age: 37
End: 2022-10-31

## 2022-11-15 ENCOUNTER — TRANSFERRED RECORDS (OUTPATIENT)
Dept: HEALTH INFORMATION MANAGEMENT | Facility: CLINIC | Age: 37
End: 2022-11-15

## 2023-01-20 ENCOUNTER — OFFICE VISIT (OUTPATIENT)
Dept: FAMILY MEDICINE | Facility: CLINIC | Age: 38
End: 2023-01-20
Payer: COMMERCIAL

## 2023-01-20 VITALS
SYSTOLIC BLOOD PRESSURE: 110 MMHG | WEIGHT: 192.8 LBS | OXYGEN SATURATION: 98 % | DIASTOLIC BLOOD PRESSURE: 74 MMHG | HEART RATE: 79 BPM | TEMPERATURE: 98.4 F | HEIGHT: 67 IN | BODY MASS INDEX: 30.26 KG/M2

## 2023-01-20 DIAGNOSIS — G89.29 CHRONIC PAIN OF LEFT KNEE: ICD-10-CM

## 2023-01-20 DIAGNOSIS — M25.562 CHRONIC PAIN OF LEFT KNEE: ICD-10-CM

## 2023-01-20 DIAGNOSIS — Z23 FLU VACCINE NEED: ICD-10-CM

## 2023-01-20 DIAGNOSIS — Z01.818 PREOPERATIVE EXAMINATION: Primary | ICD-10-CM

## 2023-01-20 LAB
ERYTHROCYTE [DISTWIDTH] IN BLOOD BY AUTOMATED COUNT: 12.5 % (ref 10–15)
HCG UR QL: NEGATIVE
HCT VFR BLD AUTO: 39.1 % (ref 35–47)
HGB BLD-MCNC: 12.4 G/DL (ref 11.7–15.7)
MCH RBC QN AUTO: 29.6 PG (ref 26.5–33)
MCHC RBC AUTO-ENTMCNC: 31.7 G/DL (ref 31.5–36.5)
MCV RBC AUTO: 93 FL (ref 78–100)
PLATELET # BLD AUTO: 374 10E3/UL (ref 150–450)
RBC # BLD AUTO: 4.19 10E6/UL (ref 3.8–5.2)
WBC # BLD AUTO: 5.6 10E3/UL (ref 4–11)

## 2023-01-20 PROCEDURE — 81025 URINE PREGNANCY TEST: CPT | Performed by: NURSE PRACTITIONER

## 2023-01-20 PROCEDURE — 36415 COLL VENOUS BLD VENIPUNCTURE: CPT | Performed by: NURSE PRACTITIONER

## 2023-01-20 PROCEDURE — 90686 IIV4 VACC NO PRSV 0.5 ML IM: CPT | Performed by: NURSE PRACTITIONER

## 2023-01-20 PROCEDURE — 90471 IMMUNIZATION ADMIN: CPT | Performed by: NURSE PRACTITIONER

## 2023-01-20 PROCEDURE — 85027 COMPLETE CBC AUTOMATED: CPT | Performed by: NURSE PRACTITIONER

## 2023-01-20 PROCEDURE — 99214 OFFICE O/P EST MOD 30 MIN: CPT | Mod: 25 | Performed by: NURSE PRACTITIONER

## 2023-01-20 ASSESSMENT — ANXIETY QUESTIONNAIRES
GAD7 TOTAL SCORE: 1
3. WORRYING TOO MUCH ABOUT DIFFERENT THINGS: NOT AT ALL
7. FEELING AFRAID AS IF SOMETHING AWFUL MIGHT HAPPEN: NOT AT ALL
IF YOU CHECKED OFF ANY PROBLEMS ON THIS QUESTIONNAIRE, HOW DIFFICULT HAVE THESE PROBLEMS MADE IT FOR YOU TO DO YOUR WORK, TAKE CARE OF THINGS AT HOME, OR GET ALONG WITH OTHER PEOPLE: NOT DIFFICULT AT ALL
2. NOT BEING ABLE TO STOP OR CONTROL WORRYING: NOT AT ALL
6. BECOMING EASILY ANNOYED OR IRRITABLE: NOT AT ALL
7. FEELING AFRAID AS IF SOMETHING AWFUL MIGHT HAPPEN: NOT AT ALL
GAD7 TOTAL SCORE: 1
5. BEING SO RESTLESS THAT IT IS HARD TO SIT STILL: NOT AT ALL
4. TROUBLE RELAXING: NOT AT ALL
8. IF YOU CHECKED OFF ANY PROBLEMS, HOW DIFFICULT HAVE THESE MADE IT FOR YOU TO DO YOUR WORK, TAKE CARE OF THINGS AT HOME, OR GET ALONG WITH OTHER PEOPLE?: NOT DIFFICULT AT ALL
1. FEELING NERVOUS, ANXIOUS, OR ON EDGE: SEVERAL DAYS

## 2023-01-20 NOTE — H&P (VIEW-ONLY)
[address] [address]  Phone: [phone]  Fax: [phone]  Primary Provider: Gerardo Freeman  Pre-op Performing Provider: SCOTT GLEZ      PREOPERATIVE EVALUATION:  Today's date: 1/20/2023    Shruthi Rogers is a 37 year old female who presents for a preoperative evaluation.    Surgical Information:  Surgery/Procedure: KNEE ARTHROSCOPY, REVISION ANTERIOR CRUCIATE LIGAMENT RECONSTRUCTION WITH QUADRICEP TENDON AUTOGRAFT; MEDIAL MENISCUS TRANSPLANT, OSTEOCHONDRAL ALLOGRAFT TO MEDIAL FEMORAL CONDYLE  Surgery Location: [facility]  Surgeon: Dr Arturo Spangler and Dr Bo House  Surgery Date: 01/27/2023  Time of Surgery: 1200  Where patient plans to recover: At home with family  Fax number for surgical facility: Note does not need to be faxed, will be available electronically in Epic.    Type of Anesthesia Anticipated: general with block    Assessment & Plan     The proposed surgical procedure is considered INTERMEDIATE risk.    (Z01.818) Preoperative examination  (primary encounter diagnosis)  Comment:   Plan: HCG qualitative urine, CBC with platelets            (M25.562,  G89.29) Chronic pain of left knee  Comment:   Plan: HCG qualitative urine, CBC with platelets            (Z23) Flu vaccine need  Comment:   Plan: INFLUENZA VACCINE IM > 6 MONTHS VALENT IIV4         (AFLURIA/FLUZONE)                     Risks and Recommendations:  The patient has the following additional risks and recommendations for perioperative complications:   - No identified additional risk factors other than previously addressed    Medication Instructions:  Patient is to take all scheduled medications on the day of surgery    RECOMMENDATION:  APPROVAL GIVEN to proceed with proposed procedure, without further diagnostic evaluation.            Subjective     HPI related to upcoming procedure: has had knee surgery in past  LMP Jan 4th,  with vasectomy    Preop  Questions 1/20/2023   1. Have you ever had a heart attack or stroke? No   2. Have you ever had surgery on your heart or blood vessels, such as a stent placement, a coronary artery bypass, or surgery on an artery in your head, neck, heart, or legs? No   3. Do you have chest pain with activity? No   4. Do you have a history of  heart failure? No   5. Do you currently have a cold, bronchitis or symptoms of other infection? No   6. Do you have a cough, shortness of breath, or wheezing? No   7. Do you or anyone in your family have previous history of blood clots? UNKNOWN - dad with MI   8. Do you or does anyone in your family have a serious bleeding problem such as prolonged bleeding following surgeries or cuts? No   9. Have you ever had problems with anemia or been told to take iron pills? No   10. Have you had any abnormal blood loss such as black, tarry or bloody stools, or abnormal vaginal bleeding? No   11. Have you ever had a blood transfusion? No   12. Are you willing to have a blood transfusion if it is medically needed before, during, or after your surgery? Yes   13. Have you or any of your relatives ever had problems with anesthesia? No   14. Do you have sleep apnea, excessive snoring or daytime drowsiness? No   15. Do you have any artifical heart valves or other implanted medical devices like a pacemaker, defibrillator, or continuous glucose monitor? No   16. Do you have artificial joints? No   17. Are you allergic to latex? No   18. Is there any chance that you may be pregnant? No       Health Care Directive:  Patient does not have a Health Care Directive or Living Will: Discussed advance care planning with patient; information given to patient to review.    Preoperative Review of :   reviewed - no record of controlled substances prescribed.      Status of Chronic Conditions:  Anxiety under good control    Review of Systems  CONSTITUTIONAL: NEGATIVE for fever, chills, change in  "weight  INTEGUMENTARY/SKIN: NEGATIVE for worrisome rashes, moles or lesions  EYES: NEGATIVE for vision changes or irritation  ENT/MOUTH: NEGATIVE for ear, mouth and throat problems  RESP: NEGATIVE for significant cough or SOB  CV: NEGATIVE for chest pain, palpitations or peripheral edema  GI: NEGATIVE for nausea, abdominal pain, heartburn, or change in bowel habits  : NEGATIVE for frequency, dysuria, or hematuria  NEURO: NEGATIVE for weakness, dizziness or paresthesias  ENDOCRINE: NEGATIVE for temperature intolerance, skin/hair changes  HEME: NEGATIVE for bleeding problems  PSYCHIATRIC: NEGATIVE for changes in mood or affect    Patient Active Problem List    Diagnosis Date Noted     Hand laceration 07/16/2022     Priority: Medium     Anxiety state 04/19/2022     Priority: Medium      Past Medical History:   Diagnosis Date     Infection due to 2019 novel coronavirus 03/2022     Past Surgical History:   Procedure Laterality Date     ARTHROSCOPIC REPAIR ACL Left     2005 and 2006     ARTHROSCOPY KNEE WITH MENISCAL REPAIR Left 10/16/2017     CYSTOSCOPY  11/15/2011     UMBILICAL HERNIA REPAIR  1993     Current Outpatient Medications   Medication Sig Dispense Refill     Multiple Vitamins-Minerals (DAILY MULTI PO) Take by mouth daily       sertraline (ZOLOFT) 50 MG tablet Take 1 tablet (50 mg) by mouth daily 90 tablet 3       No Known Allergies     Social History     Tobacco Use     Smoking status: Former     Types: Cigarettes     Smokeless tobacco: Former     Types: Chew   Substance Use Topics     Alcohol use: Not on file     Comment: couple drinks daily with dinner       History   Drug Use Unknown         Objective     /74 (BP Location: Right arm, Patient Position: Sitting)   Pulse 79   Temp 98.4  F (36.9  C)   Ht 1.689 m (5' 6.5\")   Wt 87.5 kg (192 lb 12.8 oz)   LMP 01/04/2023 (Approximate)   SpO2 98%   BMI 30.65 kg/m      Physical Exam    GENERAL APPEARANCE: healthy, alert and no distress     EYES: EOMI, " PERRL     HENT: ear canals and TM's normal and nose and mouth without ulcers or lesions     NECK: no adenopathy, no asymmetry, masses, or scars and thyroid normal to palpation     RESP: lungs clear to auscultation - no rales, rhonchi or wheezes     CV: regular rates and rhythm, normal S1 S2, no S3 or S4 and no murmur, click or rub     ABDOMEN:  soft, nontender, no HSM or masses and bowel sounds normal     SKIN: no suspicious lesions or rashes     NEURO: Normal strength and tone, sensory exam grossly normal, mentation intact and speech normal     PSYCH: mentation appears normal. and affect normal/bright     LYMPHATICS: No cervical adenopathy    No results for input(s): HGB, PLT, INR, NA, POTASSIUM, CR, A1C in the last 71462 hours.     Diagnostics:  Recent Results (from the past 24 hour(s))   HCG qualitative urine    Collection Time: 01/20/23  9:40 AM   Result Value Ref Range    hCG Urine Qualitative Negative Negative   CBC with platelets    Collection Time: 01/20/23  9:40 AM   Result Value Ref Range    WBC Count 5.6 4.0 - 11.0 10e3/uL    RBC Count 4.19 3.80 - 5.20 10e6/uL    Hemoglobin 12.4 11.7 - 15.7 g/dL    Hematocrit 39.1 35.0 - 47.0 %    MCV 93 78 - 100 fL    MCH 29.6 26.5 - 33.0 pg    MCHC 31.7 31.5 - 36.5 g/dL    RDW 12.5 10.0 - 15.0 %    Platelet Count 374 150 - 450 10e3/uL      No EKG required, no history of coronary heart disease, significant arrhythmia, peripheral arterial disease or other structural heart disease.    Revised Cardiac Risk Index (RCRI):  The patient has the following serious cardiovascular risks for perioperative complications:   - No serious cardiac risks = 0 points     RCRI Interpretation:          Signed Electronically by: Dolores Mcclain NP  Copy of this evaluation report is provided to requesting physician.      Answers for HPI/ROS submitted by the patient on 1/20/2023  GIOVANNA 7 TOTAL SCORE: 1

## 2023-01-20 NOTE — PROGRESS NOTES
[address] [address]  Phone: [phone]  Fax: [phone]  Primary Provider: Gerardo Freeman  Pre-op Performing Provider: SCOTT GLEZ      PREOPERATIVE EVALUATION:  Today's date: 1/20/2023    Shruthi Rogers is a 37 year old female who presents for a preoperative evaluation.    Surgical Information:  Surgery/Procedure: KNEE ARTHROSCOPY, REVISION ANTERIOR CRUCIATE LIGAMENT RECONSTRUCTION WITH QUADRICEP TENDON AUTOGRAFT; MEDIAL MENISCUS TRANSPLANT, OSTEOCHONDRAL ALLOGRAFT TO MEDIAL FEMORAL CONDYLE  Surgery Location: [facility]  Surgeon: Dr Arturo Spangler and Dr Bo House  Surgery Date: 01/27/2023  Time of Surgery: 1200  Where patient plans to recover: At home with family  Fax number for surgical facility: Note does not need to be faxed, will be available electronically in Epic.    Type of Anesthesia Anticipated: general with block    Assessment & Plan     The proposed surgical procedure is considered INTERMEDIATE risk.    (Z01.818) Preoperative examination  (primary encounter diagnosis)  Comment:   Plan: HCG qualitative urine, CBC with platelets            (M25.562,  G89.29) Chronic pain of left knee  Comment:   Plan: HCG qualitative urine, CBC with platelets            (Z23) Flu vaccine need  Comment:   Plan: INFLUENZA VACCINE IM > 6 MONTHS VALENT IIV4         (AFLURIA/FLUZONE)                     Risks and Recommendations:  The patient has the following additional risks and recommendations for perioperative complications:   - No identified additional risk factors other than previously addressed    Medication Instructions:  Patient is to take all scheduled medications on the day of surgery    RECOMMENDATION:  APPROVAL GIVEN to proceed with proposed procedure, without further diagnostic evaluation.            Subjective     HPI related to upcoming procedure: has had knee surgery in past  LMP Jan 4th,  with vasectomy    Preop  Questions 1/20/2023   1. Have you ever had a heart attack or stroke? No   2. Have you ever had surgery on your heart or blood vessels, such as a stent placement, a coronary artery bypass, or surgery on an artery in your head, neck, heart, or legs? No   3. Do you have chest pain with activity? No   4. Do you have a history of  heart failure? No   5. Do you currently have a cold, bronchitis or symptoms of other infection? No   6. Do you have a cough, shortness of breath, or wheezing? No   7. Do you or anyone in your family have previous history of blood clots? UNKNOWN - dad with MI   8. Do you or does anyone in your family have a serious bleeding problem such as prolonged bleeding following surgeries or cuts? No   9. Have you ever had problems with anemia or been told to take iron pills? No   10. Have you had any abnormal blood loss such as black, tarry or bloody stools, or abnormal vaginal bleeding? No   11. Have you ever had a blood transfusion? No   12. Are you willing to have a blood transfusion if it is medically needed before, during, or after your surgery? Yes   13. Have you or any of your relatives ever had problems with anesthesia? No   14. Do you have sleep apnea, excessive snoring or daytime drowsiness? No   15. Do you have any artifical heart valves or other implanted medical devices like a pacemaker, defibrillator, or continuous glucose monitor? No   16. Do you have artificial joints? No   17. Are you allergic to latex? No   18. Is there any chance that you may be pregnant? No       Health Care Directive:  Patient does not have a Health Care Directive or Living Will: Discussed advance care planning with patient; information given to patient to review.    Preoperative Review of :   reviewed - no record of controlled substances prescribed.      Status of Chronic Conditions:  Anxiety under good control    Review of Systems  CONSTITUTIONAL: NEGATIVE for fever, chills, change in  "weight  INTEGUMENTARY/SKIN: NEGATIVE for worrisome rashes, moles or lesions  EYES: NEGATIVE for vision changes or irritation  ENT/MOUTH: NEGATIVE for ear, mouth and throat problems  RESP: NEGATIVE for significant cough or SOB  CV: NEGATIVE for chest pain, palpitations or peripheral edema  GI: NEGATIVE for nausea, abdominal pain, heartburn, or change in bowel habits  : NEGATIVE for frequency, dysuria, or hematuria  NEURO: NEGATIVE for weakness, dizziness or paresthesias  ENDOCRINE: NEGATIVE for temperature intolerance, skin/hair changes  HEME: NEGATIVE for bleeding problems  PSYCHIATRIC: NEGATIVE for changes in mood or affect    Patient Active Problem List    Diagnosis Date Noted     Hand laceration 07/16/2022     Priority: Medium     Anxiety state 04/19/2022     Priority: Medium      Past Medical History:   Diagnosis Date     Infection due to 2019 novel coronavirus 03/2022     Past Surgical History:   Procedure Laterality Date     ARTHROSCOPIC REPAIR ACL Left     2005 and 2006     ARTHROSCOPY KNEE WITH MENISCAL REPAIR Left 10/16/2017     CYSTOSCOPY  11/15/2011     UMBILICAL HERNIA REPAIR  1993     Current Outpatient Medications   Medication Sig Dispense Refill     Multiple Vitamins-Minerals (DAILY MULTI PO) Take by mouth daily       sertraline (ZOLOFT) 50 MG tablet Take 1 tablet (50 mg) by mouth daily 90 tablet 3       No Known Allergies     Social History     Tobacco Use     Smoking status: Former     Types: Cigarettes     Smokeless tobacco: Former     Types: Chew   Substance Use Topics     Alcohol use: Not on file     Comment: couple drinks daily with dinner       History   Drug Use Unknown         Objective     /74 (BP Location: Right arm, Patient Position: Sitting)   Pulse 79   Temp 98.4  F (36.9  C)   Ht 1.689 m (5' 6.5\")   Wt 87.5 kg (192 lb 12.8 oz)   LMP 01/04/2023 (Approximate)   SpO2 98%   BMI 30.65 kg/m      Physical Exam    GENERAL APPEARANCE: healthy, alert and no distress     EYES: EOMI, " PERRL     HENT: ear canals and TM's normal and nose and mouth without ulcers or lesions     NECK: no adenopathy, no asymmetry, masses, or scars and thyroid normal to palpation     RESP: lungs clear to auscultation - no rales, rhonchi or wheezes     CV: regular rates and rhythm, normal S1 S2, no S3 or S4 and no murmur, click or rub     ABDOMEN:  soft, nontender, no HSM or masses and bowel sounds normal     SKIN: no suspicious lesions or rashes     NEURO: Normal strength and tone, sensory exam grossly normal, mentation intact and speech normal     PSYCH: mentation appears normal. and affect normal/bright     LYMPHATICS: No cervical adenopathy    No results for input(s): HGB, PLT, INR, NA, POTASSIUM, CR, A1C in the last 54368 hours.     Diagnostics:  Recent Results (from the past 24 hour(s))   HCG qualitative urine    Collection Time: 01/20/23  9:40 AM   Result Value Ref Range    hCG Urine Qualitative Negative Negative   CBC with platelets    Collection Time: 01/20/23  9:40 AM   Result Value Ref Range    WBC Count 5.6 4.0 - 11.0 10e3/uL    RBC Count 4.19 3.80 - 5.20 10e6/uL    Hemoglobin 12.4 11.7 - 15.7 g/dL    Hematocrit 39.1 35.0 - 47.0 %    MCV 93 78 - 100 fL    MCH 29.6 26.5 - 33.0 pg    MCHC 31.7 31.5 - 36.5 g/dL    RDW 12.5 10.0 - 15.0 %    Platelet Count 374 150 - 450 10e3/uL      No EKG required, no history of coronary heart disease, significant arrhythmia, peripheral arterial disease or other structural heart disease.    Revised Cardiac Risk Index (RCRI):  The patient has the following serious cardiovascular risks for perioperative complications:   - No serious cardiac risks = 0 points     RCRI Interpretation:          Signed Electronically by: Dolores Mcclain NP  Copy of this evaluation report is provided to requesting physician.      Answers for HPI/ROS submitted by the patient on 1/20/2023  GIOVANNA 7 TOTAL SCORE: 1

## 2023-01-27 ENCOUNTER — ANESTHESIA EVENT (OUTPATIENT)
Dept: SURGERY | Facility: CLINIC | Age: 38
End: 2023-01-27
Payer: COMMERCIAL

## 2023-01-27 ENCOUNTER — HOSPITAL ENCOUNTER (OUTPATIENT)
Facility: CLINIC | Age: 38
Discharge: HOME | End: 2023-01-27
Attending: ORTHOPAEDIC SURGERY | Admitting: ORTHOPAEDIC SURGERY
Payer: COMMERCIAL

## 2023-01-27 ENCOUNTER — ANESTHESIA (OUTPATIENT)
Dept: SURGERY | Facility: CLINIC | Age: 38
End: 2023-01-27
Payer: COMMERCIAL

## 2023-01-27 VITALS
HEIGHT: 67 IN | HEART RATE: 93 BPM | SYSTOLIC BLOOD PRESSURE: 137 MMHG | DIASTOLIC BLOOD PRESSURE: 82 MMHG | OXYGEN SATURATION: 99 % | WEIGHT: 189 LBS | TEMPERATURE: 97.3 F | RESPIRATION RATE: 18 BRPM | BODY MASS INDEX: 29.66 KG/M2

## 2023-01-27 DIAGNOSIS — M25.562 CHRONIC PAIN OF LEFT KNEE: Primary | ICD-10-CM

## 2023-01-27 DIAGNOSIS — G89.29 CHRONIC PAIN OF LEFT KNEE: Primary | ICD-10-CM

## 2023-01-27 PROCEDURE — 272N000001 HC OR GENERAL SUPPLY STERILE: Performed by: ORTHOPAEDIC SURGERY

## 2023-01-27 PROCEDURE — 710N000010 HC RECOVERY PHASE 1, LEVEL 2, PER MIN: Performed by: ORTHOPAEDIC SURGERY

## 2023-01-27 PROCEDURE — 250N000009 HC RX 250: Performed by: NURSE ANESTHETIST, CERTIFIED REGISTERED

## 2023-01-27 PROCEDURE — C1762 CONN TISS, HUMAN(INC FASCIA): HCPCS | Performed by: ORTHOPAEDIC SURGERY

## 2023-01-27 PROCEDURE — 360N000077 HC SURGERY LEVEL 4, PER MIN: Performed by: ORTHOPAEDIC SURGERY

## 2023-01-27 PROCEDURE — 250N000011 HC RX IP 250 OP 636: Performed by: ANESTHESIOLOGY

## 2023-01-27 PROCEDURE — 250N000011 HC RX IP 250 OP 636: Performed by: NURSE ANESTHETIST, CERTIFIED REGISTERED

## 2023-01-27 PROCEDURE — 250N000013 HC RX MED GY IP 250 OP 250 PS 637: Performed by: ORTHOPAEDIC SURGERY

## 2023-01-27 PROCEDURE — 999N000141 HC STATISTIC PRE-PROCEDURE NURSING ASSESSMENT: Performed by: ORTHOPAEDIC SURGERY

## 2023-01-27 PROCEDURE — 250N000025 HC SEVOFLURANE, PER MIN: Performed by: ORTHOPAEDIC SURGERY

## 2023-01-27 PROCEDURE — C1713 ANCHOR/SCREW BN/BN,TIS/BN: HCPCS | Performed by: ORTHOPAEDIC SURGERY

## 2023-01-27 PROCEDURE — 250N000009 HC RX 250: Performed by: ANESTHESIOLOGY

## 2023-01-27 PROCEDURE — 258N000003 HC RX IP 258 OP 636: Performed by: NURSE ANESTHETIST, CERTIFIED REGISTERED

## 2023-01-27 PROCEDURE — 250N000011 HC RX IP 250 OP 636: Performed by: ORTHOPAEDIC SURGERY

## 2023-01-27 PROCEDURE — 370N000017 HC ANESTHESIA TECHNICAL FEE, PER MIN: Performed by: ORTHOPAEDIC SURGERY

## 2023-01-27 PROCEDURE — 250N000011 HC RX IP 250 OP 636: Performed by: PHYSICIAN ASSISTANT

## 2023-01-27 PROCEDURE — 258N000001 HC RX 258: Performed by: ORTHOPAEDIC SURGERY

## 2023-01-27 PROCEDURE — 710N000012 HC RECOVERY PHASE 2, PER MINUTE: Performed by: ORTHOPAEDIC SURGERY

## 2023-01-27 DEVICE — IMPLANTABLE DEVICE: Type: IMPLANTABLE DEVICE | Site: KNEE | Status: FUNCTIONAL

## 2023-01-27 DEVICE — IMPLSYS 2NDRY FIXATN BIOSWVLK 4.75X19.1
Type: IMPLANTABLE DEVICE | Site: KNEE | Status: FUNCTIONAL
Brand: ARTHREX®

## 2023-01-27 DEVICE — TIGHTROPE ABS, 14 MM BUTTON
Type: IMPLANTABLE DEVICE | Site: KNEE | Status: FUNCTIONAL
Brand: ARTHREX®

## 2023-01-27 DEVICE — ACL TIGHTROPE WITH FIBERTAG
Type: IMPLANTABLE DEVICE | Site: KNEE | Status: FUNCTIONAL
Brand: ARTHREX®

## 2023-01-27 DEVICE — ACL TIGHTROPE WITH FIBERTAG, ABS
Type: IMPLANTABLE DEVICE | Site: KNEE | Status: FUNCTIONAL
Brand: ARTHREX®

## 2023-01-27 RX ORDER — OXYCODONE HYDROCHLORIDE 5 MG/1
5 TABLET ORAL ONCE
Status: COMPLETED | OUTPATIENT
Start: 2023-01-27 | End: 2023-01-27

## 2023-01-27 RX ORDER — EPINEPHRINE 1 MG/ML
INJECTION INTRAMUSCULAR; INTRAVENOUS; SUBCUTANEOUS
Status: DISCONTINUED
Start: 2023-01-27 | End: 2023-01-27 | Stop reason: HOSPADM

## 2023-01-27 RX ORDER — ESMOLOL HYDROCHLORIDE 10 MG/ML
INJECTION INTRAVENOUS PRN
Status: DISCONTINUED | OUTPATIENT
Start: 2023-01-27 | End: 2023-01-27

## 2023-01-27 RX ORDER — LIDOCAINE HYDROCHLORIDE 20 MG/ML
INJECTION, SOLUTION INFILTRATION; PERINEURAL PRN
Status: DISCONTINUED | OUTPATIENT
Start: 2023-01-27 | End: 2023-01-27

## 2023-01-27 RX ORDER — KETOROLAC TROMETHAMINE 30 MG/ML
INJECTION, SOLUTION INTRAMUSCULAR; INTRAVENOUS PRN
Status: DISCONTINUED | OUTPATIENT
Start: 2023-01-27 | End: 2023-01-27

## 2023-01-27 RX ORDER — CEFAZOLIN SODIUM/WATER 2 G/20 ML
2 SYRINGE (ML) INTRAVENOUS
Status: COMPLETED | OUTPATIENT
Start: 2023-01-27 | End: 2023-01-27

## 2023-01-27 RX ORDER — HYDROMORPHONE HCL IN WATER/PF 6 MG/30 ML
0.2 PATIENT CONTROLLED ANALGESIA SYRINGE INTRAVENOUS EVERY 5 MIN PRN
Status: DISCONTINUED | OUTPATIENT
Start: 2023-01-27 | End: 2023-01-27 | Stop reason: HOSPADM

## 2023-01-27 RX ORDER — PROPOFOL 10 MG/ML
INJECTION, EMULSION INTRAVENOUS CONTINUOUS PRN
Status: DISCONTINUED | OUTPATIENT
Start: 2023-01-27 | End: 2023-01-27

## 2023-01-27 RX ORDER — HEPARIN SODIUM 1000 [USP'U]/ML
INJECTION, SOLUTION INTRAVENOUS; SUBCUTANEOUS
Status: DISCONTINUED
Start: 2023-01-27 | End: 2023-01-27 | Stop reason: HOSPADM

## 2023-01-27 RX ORDER — ONDANSETRON 2 MG/ML
4 INJECTION INTRAMUSCULAR; INTRAVENOUS EVERY 30 MIN PRN
Status: DISCONTINUED | OUTPATIENT
Start: 2023-01-27 | End: 2023-01-27 | Stop reason: HOSPADM

## 2023-01-27 RX ORDER — PROPOFOL 10 MG/ML
INJECTION, EMULSION INTRAVENOUS PRN
Status: DISCONTINUED | OUTPATIENT
Start: 2023-01-27 | End: 2023-01-27

## 2023-01-27 RX ORDER — LIDOCAINE 40 MG/G
CREAM TOPICAL
Status: DISCONTINUED | OUTPATIENT
Start: 2023-01-27 | End: 2023-01-27 | Stop reason: HOSPADM

## 2023-01-27 RX ORDER — HYDROMORPHONE HCL IN WATER/PF 6 MG/30 ML
0.4 PATIENT CONTROLLED ANALGESIA SYRINGE INTRAVENOUS EVERY 5 MIN PRN
Status: DISCONTINUED | OUTPATIENT
Start: 2023-01-27 | End: 2023-01-27 | Stop reason: HOSPADM

## 2023-01-27 RX ORDER — HYDROXYZINE PAMOATE 25 MG/1
25-50 CAPSULE ORAL 3 TIMES DAILY PRN
Qty: 25 CAPSULE | Refills: 0 | Status: SHIPPED | OUTPATIENT
Start: 2023-01-27 | End: 2023-04-21

## 2023-01-27 RX ORDER — FENTANYL CITRATE 50 UG/ML
25-100 INJECTION, SOLUTION INTRAMUSCULAR; INTRAVENOUS
Status: DISCONTINUED | OUTPATIENT
Start: 2023-01-27 | End: 2023-01-27 | Stop reason: HOSPADM

## 2023-01-27 RX ORDER — OXYCODONE HYDROCHLORIDE 5 MG/1
5-10 TABLET ORAL EVERY 4 HOURS PRN
Qty: 25 TABLET | Refills: 0 | Status: SHIPPED | OUTPATIENT
Start: 2023-01-27 | End: 2023-01-31

## 2023-01-27 RX ORDER — ONDANSETRON 2 MG/ML
INJECTION INTRAMUSCULAR; INTRAVENOUS PRN
Status: DISCONTINUED | OUTPATIENT
Start: 2023-01-27 | End: 2023-01-27

## 2023-01-27 RX ORDER — ONDANSETRON 4 MG/1
4 TABLET, ORALLY DISINTEGRATING ORAL EVERY 30 MIN PRN
Status: DISCONTINUED | OUTPATIENT
Start: 2023-01-27 | End: 2023-01-27 | Stop reason: HOSPADM

## 2023-01-27 RX ORDER — SODIUM CHLORIDE, SODIUM LACTATE, POTASSIUM CHLORIDE, CALCIUM CHLORIDE 600; 310; 30; 20 MG/100ML; MG/100ML; MG/100ML; MG/100ML
INJECTION, SOLUTION INTRAVENOUS CONTINUOUS
Status: DISCONTINUED | OUTPATIENT
Start: 2023-01-27 | End: 2023-01-27 | Stop reason: HOSPADM

## 2023-01-27 RX ORDER — BUPIVACAINE HYDROCHLORIDE 2.5 MG/ML
INJECTION, SOLUTION INFILTRATION; PERINEURAL
Status: DISCONTINUED
Start: 2023-01-27 | End: 2023-01-27 | Stop reason: WASHOUT

## 2023-01-27 RX ORDER — LABETALOL HYDROCHLORIDE 5 MG/ML
INJECTION, SOLUTION INTRAVENOUS PRN
Status: DISCONTINUED | OUTPATIENT
Start: 2023-01-27 | End: 2023-01-27

## 2023-01-27 RX ORDER — CEFAZOLIN SODIUM/WATER 2 G/20 ML
2 SYRINGE (ML) INTRAVENOUS SEE ADMIN INSTRUCTIONS
Status: DISCONTINUED | OUTPATIENT
Start: 2023-01-27 | End: 2023-01-27 | Stop reason: HOSPADM

## 2023-01-27 RX ORDER — DEXAMETHASONE SODIUM PHOSPHATE 4 MG/ML
INJECTION, SOLUTION INTRA-ARTICULAR; INTRALESIONAL; INTRAMUSCULAR; INTRAVENOUS; SOFT TISSUE PRN
Status: DISCONTINUED | OUTPATIENT
Start: 2023-01-27 | End: 2023-01-27

## 2023-01-27 RX ORDER — FENTANYL CITRATE 50 UG/ML
25 INJECTION, SOLUTION INTRAMUSCULAR; INTRAVENOUS EVERY 5 MIN PRN
Status: DISCONTINUED | OUTPATIENT
Start: 2023-01-27 | End: 2023-01-27 | Stop reason: HOSPADM

## 2023-01-27 RX ORDER — GLYCOPYRROLATE 0.2 MG/ML
INJECTION, SOLUTION INTRAMUSCULAR; INTRAVENOUS PRN
Status: DISCONTINUED | OUTPATIENT
Start: 2023-01-27 | End: 2023-01-27

## 2023-01-27 RX ORDER — FENTANYL CITRATE 50 UG/ML
50 INJECTION, SOLUTION INTRAMUSCULAR; INTRAVENOUS EVERY 5 MIN PRN
Status: DISCONTINUED | OUTPATIENT
Start: 2023-01-27 | End: 2023-01-27 | Stop reason: HOSPADM

## 2023-01-27 RX ORDER — SODIUM CHLORIDE, SODIUM LACTATE, POTASSIUM CHLORIDE, CALCIUM CHLORIDE 600; 310; 30; 20 MG/100ML; MG/100ML; MG/100ML; MG/100ML
INJECTION, SOLUTION INTRAVENOUS CONTINUOUS PRN
Status: DISCONTINUED | OUTPATIENT
Start: 2023-01-27 | End: 2023-01-27

## 2023-01-27 RX ORDER — BUPIVACAINE HYDROCHLORIDE 5 MG/ML
INJECTION, SOLUTION EPIDURAL; INTRACAUDAL
Status: COMPLETED | OUTPATIENT
Start: 2023-01-27 | End: 2023-01-27

## 2023-01-27 RX ORDER — FENTANYL CITRATE 50 UG/ML
INJECTION, SOLUTION INTRAMUSCULAR; INTRAVENOUS PRN
Status: DISCONTINUED | OUTPATIENT
Start: 2023-01-27 | End: 2023-01-27

## 2023-01-27 RX ADMIN — HYDROMORPHONE HYDROCHLORIDE 0.5 MG: 1 INJECTION, SOLUTION INTRAMUSCULAR; INTRAVENOUS; SUBCUTANEOUS at 13:33

## 2023-01-27 RX ADMIN — DEXAMETHASONE SODIUM PHOSPHATE 4 MG: 4 INJECTION, SOLUTION INTRA-ARTICULAR; INTRALESIONAL; INTRAMUSCULAR; INTRAVENOUS; SOFT TISSUE at 11:59

## 2023-01-27 RX ADMIN — SODIUM CHLORIDE, POTASSIUM CHLORIDE, SODIUM LACTATE AND CALCIUM CHLORIDE: 600; 310; 30; 20 INJECTION, SOLUTION INTRAVENOUS at 11:43

## 2023-01-27 RX ADMIN — PROPOFOL 200 MG: 10 INJECTION, EMULSION INTRAVENOUS at 11:50

## 2023-01-27 RX ADMIN — FENTANYL CITRATE 25 MCG: 50 INJECTION, SOLUTION INTRAMUSCULAR; INTRAVENOUS at 16:58

## 2023-01-27 RX ADMIN — OXYCODONE HYDROCHLORIDE 5 MG: 5 TABLET ORAL at 17:45

## 2023-01-27 RX ADMIN — LIDOCAINE HYDROCHLORIDE 20 ML: 20 INJECTION, SOLUTION INFILTRATION; PERINEURAL at 11:50

## 2023-01-27 RX ADMIN — ONDANSETRON 4 MG: 2 INJECTION INTRAMUSCULAR; INTRAVENOUS at 15:45

## 2023-01-27 RX ADMIN — FENTANYL CITRATE 50 MCG: 50 INJECTION, SOLUTION INTRAMUSCULAR; INTRAVENOUS at 12:23

## 2023-01-27 RX ADMIN — PROPOFOL 200 MCG/KG/MIN: 10 INJECTION, EMULSION INTRAVENOUS at 11:53

## 2023-01-27 RX ADMIN — KETOROLAC TROMETHAMINE 30 MG: 30 INJECTION, SOLUTION INTRAMUSCULAR at 16:22

## 2023-01-27 RX ADMIN — ESMOLOL HYDROCHLORIDE 30 MG: 10 INJECTION, SOLUTION INTRAVENOUS at 13:18

## 2023-01-27 RX ADMIN — HYDROMORPHONE HYDROCHLORIDE 0.5 MG: 1 INJECTION, SOLUTION INTRAMUSCULAR; INTRAVENOUS; SUBCUTANEOUS at 13:35

## 2023-01-27 RX ADMIN — Medication 2 G: at 11:43

## 2023-01-27 RX ADMIN — ESMOLOL HYDROCHLORIDE 20 MG: 10 INJECTION, SOLUTION INTRAVENOUS at 12:25

## 2023-01-27 RX ADMIN — GLYCOPYRROLATE 0.2 MG: 0.2 INJECTION, SOLUTION INTRAMUSCULAR; INTRAVENOUS at 11:59

## 2023-01-27 RX ADMIN — ESMOLOL HYDROCHLORIDE 20 MG: 10 INJECTION, SOLUTION INTRAVENOUS at 13:06

## 2023-01-27 RX ADMIN — LABETALOL HYDROCHLORIDE 5 MG: 5 INJECTION, SOLUTION INTRAVENOUS at 13:46

## 2023-01-27 RX ADMIN — FENTANYL CITRATE 100 MCG: 50 INJECTION, SOLUTION INTRAMUSCULAR; INTRAVENOUS at 16:25

## 2023-01-27 RX ADMIN — BUPIVACAINE HYDROCHLORIDE 15 ML: 5 INJECTION, SOLUTION EPIDURAL; INTRACAUDAL; PERINEURAL at 10:45

## 2023-01-27 RX ADMIN — FENTANYL CITRATE 50 MCG: 50 INJECTION, SOLUTION INTRAMUSCULAR; INTRAVENOUS at 12:52

## 2023-01-27 RX ADMIN — ESMOLOL HYDROCHLORIDE 30 MG: 10 INJECTION, SOLUTION INTRAVENOUS at 12:37

## 2023-01-27 ASSESSMENT — ACTIVITIES OF DAILY LIVING (ADL)
ADLS_ACUITY_SCORE: 35

## 2023-01-27 NOTE — ANESTHESIA CARE TRANSFER NOTE
Patient: Shruthi Rogers    Procedure: Procedure(s):  KNEE ARTHROSCOPY, REVISION ANTERIOR CRUCIATE LIGAMENT RECONSTRUCTION WITH QUADRICEP TENDON AUTOGRAFT;  MEDIAL MENISCUS TRANSPLANT  OSTEOCHONDRAL ALLOGRAFT TO MEDIAL FEMORAL CONDYLE       Diagnosis: Left ACL tear [S83.512A]  Tear of meniscus of left knee [S83.207A]  Diagnosis Additional Information: No value filed.    Anesthesia Type:   General     Note:    Oropharynx: oropharynx clear of all foreign objects and spontaneously breathing  Level of Consciousness: drowsy  Oxygen Supplementation: face mask  Level of Supplemental Oxygen (L/min / FiO2): 8  Independent Airway: airway patency satisfactory and stable  Dentition: dentition unchanged  Vital Signs Stable: post-procedure vital signs reviewed and stable  Report to RN Given: handoff report given  Patient transferred to: PACU    Handoff Report: Identifed the Patient, Identified the Reponsible Provider, Reviewed the pertinent medical history, Discussed the surgical course, Reviewed Intra-OP anesthesia mangement and issues during anesthesia, Set expectations for post-procedure period and Allowed opportunity for questions and acknowledgement of understanding      Vitals:  Vitals Value Taken Time   /82 01/27/23 1630   Temp 36.5  C (97.7  F) 01/27/23 1627   Pulse 90 01/27/23 1631   Resp 8 01/27/23 1631   SpO2 100 % 01/27/23 1631   Vitals shown include unvalidated device data.    Electronically Signed By: LATHA Verdugo CRNA  January 27, 2023  4:33 PM

## 2023-01-27 NOTE — ANESTHESIA PROCEDURE NOTES
Airway       Patient location during procedure: OR       Procedure Start/Stop Times: 1/27/2023 11:52 AM  Staff -        CRNA: Esdras Gustafson APRN CRNA       Performed By: CRNA  Consent for Airway        Urgency: elective  Indications and Patient Condition       Indications for airway management: sharmaine-procedural        Final Airway Details       Final airway type: supraglottic airway    Supraglottic Airway Details        Type: LMA       Brand: Ambu AuraGain       LMA size: 4    Post intubation assessment        Placement verified by: capnometry and chest rise        Number of attempts at approach: 1       Ease of procedure: easy    Medication(s) Administered   Medication Administration Time: 1/27/2023 11:52 AM

## 2023-01-27 NOTE — ANESTHESIA PROCEDURE NOTES
Adductor canal Procedure Note    Pre-Procedure   Staff -        Anesthesiologist:  Sadi Caban MD       Performed By: anesthesiologist       Location: pre-op       Procedure Start/Stop Times: 1/27/2023 10:45 AM and 1/27/2023 10:50 AM       Pre-Anesthestic Checklist: patient identified, IV checked, site marked, risks and benefits discussed, informed consent, monitors and equipment checked, pre-op evaluation, at physician/surgeon's request and post-op pain management  Timeout:       Correct Patient: Yes        Correct Procedure: Yes        Correct Site: Yes        Correct Position: Yes        Correct Laterality: Yes        Site Marked: Yes  Procedure Documentation  Procedure: Adductor canal       Laterality: left       Patient Position: supine       Patient Prep/Sterile Barriers: sterile gloves, mask       Skin prep: Chloraprep       Needle Type: short bevel       Needle Gauge: 21.        Needle Length (Inches): 4        Ultrasound guided       1. Ultrasound was used to identify targeted nerve, plexus, vascular marker, or fascial plane and place a needle adjacent to it in real-time.       2. Ultrasound was used to visualize the spread of anesthetic in close proximity to the above referenced structure.       3. A permanent image is entered into the patient's record.       4. The visualized anatomic structures appeared normal.       5. There were no apparent abnormal pathologic findings.    Assessment/Narrative         The placement was negative for: blood aspirated, painful injection and site bleeding       Paresthesias: No.       Bolus given via needle..        Secured via.        Insertion/Infusion Method: Single Shot       Complications: none       Injection made incrementally with aspirations every 5 mL.    Medication(s) Administered   Bupivacaine 0.5% PF (Infiltration) - Infiltration   15 mL - 1/27/2023 10:45:00 AM  Medication Administration Time: 1/27/2023 10:45 AM      FOR [facility] ([facility]) ONLY:   Pain  "Team Contact information: please page the Pain Team Via [facility]. Search \"Pain\". During daytime hours, please page the attending first. At night please page the resident first.    "

## 2023-01-27 NOTE — ANESTHESIA POSTPROCEDURE EVALUATION
Patient: Shruthi Rogers    Procedure: Procedure(s):  KNEE ARTHROSCOPY, REVISION ANTERIOR CRUCIATE LIGAMENT RECONSTRUCTION WITH QUADRICEP TENDON AUTOGRAFT;  MEDIAL MENISCUS TRANSPLANT  OSTEOCHONDRAL ALLOGRAFT TO MEDIAL FEMORAL CONDYLE       Anesthesia Type:  General    Note:  Disposition: Outpatient   Postop Pain Control: Uneventful            Sign Out: Well controlled pain   PONV: No   Neuro/Psych: Uneventful            Sign Out: Acceptable/Baseline neuro status   Airway/Respiratory: Uneventful            Sign Out: Acceptable/Baseline resp. status   CV/Hemodynamics: Uneventful            Sign Out: Acceptable CV status; No obvious hypovolemia; No obvious fluid overload   Other NRE: NONE   DID A NON-ROUTINE EVENT OCCUR? No           Last vitals:  Vitals Value Taken Time   /84 01/27/23 1650   Temp 36.5  C (97.7  F) 01/27/23 1627   Pulse 93 01/27/23 1656   Resp 15 01/27/23 1656   SpO2 97 % 01/27/23 1656   Vitals shown include unvalidated device data.    Electronically Signed By: Ceasar Peace MD  January 27, 2023  4:58 PM

## 2023-01-27 NOTE — OP NOTE
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  Left knee arthroscopy with medial meniscus allograft transplant  Left knee osteochondral allograft to the medial femoral condyle  Left tibia deep hardware removal of prior ACL screw and washer    FINDINGS:  Incompetent ACL  Approximately 22 mm x 22 mm grade III chondromalacia medial femoral condyle  Prior medial meniscectomy    SURGEON:  Arturo Spangler MD    Assistant Surgeon:  Bo House MD    ASSISTANT:  ADILSON Burgess who was required for this operation due to the complexity of the procedure, for proper positioning of the limb during the operation, and for patient safety.    ESTIMATED BLOOD LOSS:  25 mL    TOURNIQUET TIME:  120 minutes    DRAINS:  None     COMPLICATIONS:  None    SPECIMENS SENT:  None    HISTORY OF PRESENT ILLNESS/INDICATIONS FOR PROCEDURE:  Shruthi is a 37-year-old female who underwent ACL reconstruction several years ago and then subsequent medial meniscectomy and has had ongoing issues of instability and medial joint pain.  Risks and benefits of the procedure were discussed with the patient and family including graft rupture, stiffness, ongoing pain, infection, damage to nearby structures. They were understandable this and wished to proceed.    DESCRIPTION OF PROCEDURE:  The patient was identified in the preoperative holding area where the left knee was marked and surgical consent was confirmed. A regional block was placed by anesthesia. The patient was thenbrought to the operating room and placed supine on the OR table, with appropriate padding of all bony and neural prominences. A non-sterile tourniquet was placed high on the operative thigh. The operative extremity was thenprepped and draped in the usual  sterile fashion. A presurgical timeout to confirm the correct patient, operative site, allergies, and all necessary equipment was completed. Appropriate IV antibiotics of Ancef 2 g were administeredimmediately prior to incision.    The leg was exsanguinated with an Esmarch bandage, and tourniquet inflated to 250 mmHg.  I then made an incision over the superior portion of the patella proximally to harvest the quadriceps tendon.  Once the tendon had been exposed I used a 9 mm double bladed knife and excised a central slip with a length of about 70 mm from the central portion of the quad tendon.  The tendon was then taken to the back table where the physician assistant prepared the graft with Arthrex tight rope button systems on both sides of the graft.  It was sized to be 9.5 mm on the femoral side and 9 mm on the tibial side.  It was then wrapped in a wet sponge and left on tension.      Standard anterolateral arthroscopic portal was established and diagnostic arthroscopy isperformed with the findings as noted above. An anterior medial portal was also established under direct visualization.  Remnant medial meniscus was debrided back to a stable transition with a 1 mm remnant present.  Prior ACL graft was also excised using the shaver.  Femoral tunnel was noted to be vertical and nonanatomic and would not fall into the footprint of our ACL reconstruction.  At this same time on the back table I prepared the meniscal allograft.  The entirety of the meniscus was removed from the bone block and I placed 2 whipstitches 1 on each end of the meniscus with #2 FiberWire.  2 more 0 FiberWire's were then placed at the posterior medial corner of the meniscus to aid in passing and fixation.  At the same time Dr. House did perform a posterior medial approach to the knee to expose the posterior capsule for passage of sutures where the meniscal transplant and repair.  Dissection was taken down to level sartorial fascia that was  opened up and then a plane was developed between the posterior capsule and the gastrocnemius posteriorly and a spoon was placed in this interval to protect the neurovascular structures.    We then turned our attention to preparing our tunnels in the joint.  Tibial incision was made through her prior tibial scar extending this distally.  We did expose the prior screw and washer that was used for post fixation on the tibia.  This was removed using a small frag screwdriver and all excess suture was removed from the tunnel as well.  Femoral tunnel for the ACL was then prepared using an anterior medial portal and the knee hyperflexed we placed a Beath pin in the center of the ACL footprint with a low-profile reamer reamed a 9.5 mm tunnel to a depth of about 25 mm.  Passing stitch was then shuttled through this tunnel.  Meniscal root guide was then used to drill a flip cutter up into the joint at the posterior horn root attachment site.  We then reamed a 7 mm tunnel to a depth just through the subchondral bone to good bleeding bone.  Passing stitch was passed through this tunnel.  Next we then drilled and retroreamed using a flip cutter our tibial tunnel as well just medial to the anterior horn the lateral meniscus.  This was a 9 mm tunnel to a depth of just past 25 mm and a passing stitch was also shuttled through this tunnel as well.  Next using a suture lasso we placed 2 passing sutures at the junction of the body and posterior horn the medial meniscus.    Medial meniscus allograft was then brought up to the field and all the passing stitches of the posterior horn root, as well as the stitches at the junction of the body and posterior horn were then shuttled out and the meniscus was pulled into the joint and reduced.  Sutures at the body and posterior horn were tied to each other to secure this location.  Posterior root attachment site was fixed to the anterior tibia using a 4.75 mm swivel lock anchor.  We then fixed  the anterior horn root attachment site using a 4.75 millimeters swivel lock anchor.  Finally we placed a total of 6 vertical mattress sutures using an inside-out technique with meniscal tape sutures.  The sutures were all tied over the capsule medially.  This completed the meniscal transplant portion of the procedure and we then proceeded with our ACL reconstruction.    Both ACL passing sutures were then pulled out the anterior medial portal being sure that there was no soft tissue bridges. The graft was brought to the operating table andthe was then passed through the tunnels using the passing suture. The graft was then pulled up into the knee and the button was flipped on the lateral cortex. The graft was then pulled up into the femoral socket to the markto placing the graft at 20 mm. Tension was pulled on the tibial side. Next pulling tension on the hamstring distally the knee was cycled. The knee was then placed in extension and a detachable tight rope button was placedand the tibial side was tightened in position.  Final tightening was then performed on both the tibial and femoral side.    The proximal suture button was then snugged up once again, the leg was then brought into fullextension and again final tightening was performed on the femoral side. I then put the scope back in the knee and there was good tension within the ACL graft. The knee was brought into his normal hyperextension and there wasno impingement of the graft. An exam was then performed with a 1+ Lachman with a solid endpoint, and the knee was taken through a full range of motion. He had no pivot shift.  An internal brace was then also placed by loading the fiber tapes into a 4.75 mm swivel lock anchor which was tensioned appropriately and placed in the anterior cortex of the tibia.    With the meniscus transplant and ACL in our place, we then extended our medial portal approximately and did a capsulotomy with a medial parapatellar approach  to the knee to expose medial femoral condyle.  Z retractors were placed both medially and laterally to the medial femoral condyle to expose the cartilage lesion on the medial femoral condyle.  At this time I also did our bone marrow aspirate to prepare the bone marrow aspirate concentrate on the back table for soakage of our allograft.  Fresh frozen allograft condyle was opened on the back table.  Then using the Arthrex OATS set, we selected a 22.5 mm plug size that would repair the entire cartilage surface that was damaged.  Central pin was placed and we reamed to a depth of about 8 mm in the recipient site.  On the back table then we prepared our allograft.  A 22.5 mm donor plug was prepared and a saw was used to cut it at the appropriate depth.  Recipient and donor sites were both measured to be the same depth all the way around.  We then tapered the edge of the graft so it would fit well into the recipient site.  The graft was then copiously irrigated with 2 L of pulse lavage saline on the bony side.  Graft was then soaked in the bone marrow aspirate for 5 minutes.  During this time we did use the dilator and the recipient site in the depths were again confirmed for placement of the graft.  Finally the plug was then brought up to the field and with finger pressure was placed into the recipient site and pushed into position with good fit.  There was noted to be smooth contour of the cartilage all the way around the plug in the native cartilage.  Joint was then irrigated.  I placed 2 more stitches in the anterior horn of the medial meniscus into the anterior capsule with 2-0 FiberWire.  Left lower bone marrow aspirate was also injected into the joint and the joint was then closed in layers with a #1 Vicryl to close the capsule, 0 Vicryl, 2-0 Vicryl and 3-0 Monocryl in layers up to skin.    Final copious irrigation was performed in the incisions and we proceded with closure. A 0 Vicryl was used to close fascial  layers,  2-0 vicryl suture was used to close the deep dermal layer, and then a 3-0 monocryl was then used to close the skin and poral sites. Steri-strips, 4x4 gauze, ABD pads, webril andan ACE wrap were applied and a knee immobilizer was placed.    The pateint was then reversed from anesthesia, extubated and taken to the PACU in stable condition.    Postoperative plan   Nonweightbearing left lower extremity using crutches  Okay to begin range of motion 0 to 90 degrees  begin physical therapy in 7-10 days to begin working on range of motion and quad strengthening  follow up in clinic in 2 weeks

## 2023-01-28 NOTE — PROVIDER NOTIFICATION
Pt stated that one of the incisions was bleeding thru the dressings and she was having awful pains even with pain meds on board. She went to the ER, the knee dressing was rewrapped and she was given so dilaudid for pain. She was also directed on how to better control her pain. She stated the incision is no longer bleeding, but she is still having pain even with the new pain recommendations. She was referred to her surgeon and the number was offered.

## 2023-01-31 ENCOUNTER — OFFICE VISIT (OUTPATIENT)
Dept: FAMILY MEDICINE | Facility: CLINIC | Age: 38
End: 2023-01-31
Payer: COMMERCIAL

## 2023-01-31 VITALS — TEMPERATURE: 98.7 F | SYSTOLIC BLOOD PRESSURE: 114 MMHG | DIASTOLIC BLOOD PRESSURE: 84 MMHG | HEART RATE: 84 BPM

## 2023-01-31 DIAGNOSIS — M79.89 CALF SWELLING: Primary | ICD-10-CM

## 2023-01-31 DIAGNOSIS — Z98.890 S/P RECONSTRUCTION OF LIGAMENT OF KNEE: ICD-10-CM

## 2023-01-31 PROCEDURE — 99213 OFFICE O/P EST LOW 20 MIN: CPT | Performed by: PHYSICIAN ASSISTANT

## 2023-01-31 ASSESSMENT — ENCOUNTER SYMPTOMS
NAUSEA: 0
CONSTITUTIONAL NEGATIVE: 1
COUGH: 0
JOINT SWELLING: 1
SHORTNESS OF BREATH: 0
VOMITING: 0
CONSTIPATION: 1

## 2023-01-31 ASSESSMENT — PAIN SCALES - GENERAL: PAINLEVEL: MODERATE PAIN (4)

## 2023-01-31 NOTE — PROGRESS NOTES
US orders faxed to the [facility] Rad Dept for 1213 US appt today.  Mihir Wiseman CMA

## 2023-01-31 NOTE — PROGRESS NOTES
1. Calf swelling  Will get ultrasound of left lower leg today  Keep elevating and icing leg    - US Lower Extremity Venous Duplex Left; Future    2. S/P reconstruction of ligament of knee    - US Lower Extremity Venous Duplex Left; Future      Subjective   Shruthi is a 37 year old, presenting for the following health issues:  Swelling (L ankle. S?P left ACL repair on 1/27/23. ED for knee pain on 1/28. Concerns about swelling.Knee pain is better. )      [facility]     ED/ Followup:    Facility:  [facility]  Date of visit: 1/28/23   Reason for visit: L knee pain  Current Status: Now swelling in L ankle - noticed yesterday AM and has been icing. Was told by surgeon to get US. Post op surgical appt on 2/20. No personal hx of clots, father has had them.       She had left knee reconstructive surgery on 1/27,  She had increased post-op pain in 1/28 and was seen in [facility] ED  Pain is under control now  Yesterday noticed swelling in left ankle, and swelling is worse today, she has been icing it  she contacted her surgical team and they suggested getting an ultrasound      Review of Systems   Constitutional: Negative.    HENT: Negative.    Respiratory: Negative for cough and shortness of breath.    Gastrointestinal: Positive for constipation. Negative for nausea and vomiting.   Musculoskeletal: Positive for joint swelling.            Objective    /84 (BP Location: Right arm, Patient Position: Sitting, Cuff Size: Adult Large)   Pulse 84   Temp 98.7  F (37.1  C) (Tympanic)   LMP 01/04/2023 (Approximate)   There is no height or weight on file to calculate BMI.  Physical Exam  Vitals reviewed.   Constitutional:       Appearance: Normal appearance.   Cardiovascular:      Rate and Rhythm: Normal rate and regular rhythm.      Pulses: Normal pulses.      Heart sounds: Normal heart sounds.   Pulmonary:      Effort: Pulmonary effort is normal.      Breath sounds: Normal breath sounds.   Musculoskeletal:      Comments: Left leg is in knee  brace, bruising along distal medial thigh,  Left calf has bruising, fullness and tenderness down to ankle, no foot swelling, no calf pain with foot dorsiflexion   Neurological:      Mental Status: She is alert.

## 2023-02-10 ENCOUNTER — TRANSFERRED RECORDS (OUTPATIENT)
Dept: HEALTH INFORMATION MANAGEMENT | Facility: CLINIC | Age: 38
End: 2023-02-10
Payer: COMMERCIAL

## 2023-03-13 ENCOUNTER — TRANSFERRED RECORDS (OUTPATIENT)
Dept: HEALTH INFORMATION MANAGEMENT | Facility: CLINIC | Age: 38
End: 2023-03-13
Payer: COMMERCIAL

## 2023-04-21 ENCOUNTER — ANCILLARY PROCEDURE (OUTPATIENT)
Dept: GENERAL RADIOLOGY | Facility: CLINIC | Age: 38
End: 2023-04-21
Payer: COMMERCIAL

## 2023-04-21 ENCOUNTER — OFFICE VISIT (OUTPATIENT)
Dept: FAMILY MEDICINE | Facility: CLINIC | Age: 38
End: 2023-04-21
Payer: COMMERCIAL

## 2023-04-21 VITALS
WEIGHT: 200.1 LBS | HEART RATE: 68 BPM | OXYGEN SATURATION: 99 % | BODY MASS INDEX: 31.4 KG/M2 | HEIGHT: 67 IN | TEMPERATURE: 98.1 F | SYSTOLIC BLOOD PRESSURE: 130 MMHG | RESPIRATION RATE: 20 BRPM | DIASTOLIC BLOOD PRESSURE: 78 MMHG

## 2023-04-21 DIAGNOSIS — M25.521 RIGHT ELBOW PAIN: Primary | ICD-10-CM

## 2023-04-21 DIAGNOSIS — M25.521 RIGHT ELBOW PAIN: ICD-10-CM

## 2023-04-21 PROCEDURE — 99213 OFFICE O/P EST LOW 20 MIN: CPT

## 2023-04-21 PROCEDURE — 73070 X-RAY EXAM OF ELBOW: CPT | Mod: TC | Performed by: RADIOLOGY

## 2023-04-21 NOTE — PROGRESS NOTES
"  Assessment & Plan     Right elbow pain  Right elbow pain after fall 6 days ago.  Patient does have full range of motion but experiences pain with full extension of her elbow, also experiences pain with flexion and extension of her wrist.  No swelling or tenderness with palpation, no bony promises, no deformity on exam..  - XR Elbow Right 2 Views; Future  Elbow x-ray negative, patient instructed to use rest, ice, compression and elevation for healing.  Instructed to continue using and that she may use elbow compression sheath for support.  Instructed to continue using Tylenol and ibuprofen OTC for pain relief.           BMI:   Estimated body mass index is 31.58 kg/m  as calculated from the following:    Height as of this encounter: 1.695 m (5' 6.75\").    Weight as of this encounter: 90.8 kg (200 lb 1.6 oz).           LATHA Cespedes CNP  [facility]    Gennaro Hawkins is a 37 year old, presenting for the following health issues:  Elbow Pain (Right elbow pain x 6 days - Injured after tripping over tractor.)        4/21/2023     9:46 AM   Additional Questions   Roomed by Kaia     Shruthi is a 37-year-old female ambulatory to clinic accompanied by self.  Reports she Tripped over a tractor attachment Saturday padilla. Fell hitting chin and right elbow. Woke during the night Saturday in pain. Hurts to extend fully. Ice, ibuprofen and tylenol provides mild relief.     Elbow Pain    History of Present Illness       Reason for visit:  Right elbow injury. I want to make sure there are no broken bones.  Symptom onset:  3-7 days ago  Symptoms include:  Pain, Swelling, Hard to straighten fully, bruising  Symptom intensity:  Moderate  Symptom progression:  Staying the same  Had these symptoms before:  No  What makes it worse:  Straightening, twisting lower arm  What makes it better:  Bent position against stomach, tylenol helps a little tooShe consumes 2 sweetened beverage(s) daily.She " "exercises with enough effort to increase her heart rate 20 to 29 minutes per day.  She exercises with enough effort to increase her heart rate 3 or less days per week.   She is taking medications regularly.       Pain History:  When did you first notice your pain? Saturday (6 days ago)   Have you seen anyone else for your pain? No  How has your pain affected your ability to work? No effect on work.  Where in your body do you have pain? Right Elbow          Review of Systems   Constitutional, HEENT, cardiovascular, pulmonary, gi and gu systems are negative, except as otherwise noted.      Objective    /78 (BP Location: Right arm, Patient Position: Sitting, Cuff Size: Adult Large)   Pulse 68   Temp 98.1  F (36.7  C) (Oral)   Resp 20   Ht 1.695 m (5' 6.75\")   Wt 90.8 kg (200 lb 1.6 oz)   LMP 04/01/2023 (Approximate)   SpO2 99%   BMI 31.58 kg/m    Body mass index is 31.58 kg/m .  Physical Exam   GENERAL: healthy, alert and no distress  NECK: no adenopathy, no asymmetry, masses, or scars and thyroid normal to palpation  RESP: lungs clear to auscultation - no rales, rhonchi or wheezes  CV: regular rate and rhythm, normal S1 S2, no S3 or S4, no murmur, click or rub, no peripheral edema and peripheral pulses strong  ABDOMEN: soft, nontender, no hepatosplenomegaly, no masses and bowel sounds normal                    "

## 2023-04-24 ENCOUNTER — TRANSFERRED RECORDS (OUTPATIENT)
Dept: HEALTH INFORMATION MANAGEMENT | Facility: CLINIC | Age: 38
End: 2023-04-24
Payer: COMMERCIAL

## 2023-05-13 ENCOUNTER — HEALTH MAINTENANCE LETTER (OUTPATIENT)
Age: 38
End: 2023-05-13

## 2023-06-26 ENCOUNTER — TRANSFERRED RECORDS (OUTPATIENT)
Dept: HEALTH INFORMATION MANAGEMENT | Facility: CLINIC | Age: 38
End: 2023-06-26
Payer: COMMERCIAL

## 2023-08-28 DIAGNOSIS — F41.9 ANXIETY AND DEPRESSION: ICD-10-CM

## 2023-08-28 DIAGNOSIS — F32.A ANXIETY AND DEPRESSION: ICD-10-CM

## 2023-08-29 NOTE — TELEPHONE ENCOUNTER
"Routing refill request to provider for review/approval because:  Phq9      Last Written Prescription Date:  6/28/22  Last Fill Quantity: 90,  # refills: 3   Last office visit provider:  4/21/23 with sarah    PHQ-9 score:        6/15/2022     6:34 AM   PHQ   PHQ-9 Total Score 2    2   Q9: Thoughts of better off dead/self-harm past 2 weeks Not at all    Not at all     Requested Prescriptions   Pending Prescriptions Disp Refills    sertraline (ZOLOFT) 50 MG tablet [Pharmacy Med Name: SERTRALINE 50MG TABLETS] 90 tablet 3     Sig: TAKE 1 TABLET(50 MG) BY MOUTH DAILY       SSRIs Protocol Failed - 8/29/2023  3:39 PM        Failed - PHQ-9 score less than 5 in past 6 months     Please review last PHQ-9 score.           Passed - Medication is active on med list        Passed - Patient is age 18 or older        Passed - No active pregnancy on record        Passed - No positive pregnancy test in last 12 months        Passed - Recent (6 mo) or future (30 days) visit within the authorizing provider's specialty     Patient had office visit in the last 6 months or has a visit in the next 30 days with authorizing provider or within the authorizing provider's specialty.  See \"Patient Info\" tab in inbasket, or \"Choose Columns\" in Meds & Orders section of the refill encounter.                 ALCIDES MEZA RN 08/29/23 3:39 PM  "

## 2023-09-25 ENCOUNTER — TRANSFERRED RECORDS (OUTPATIENT)
Dept: HEALTH INFORMATION MANAGEMENT | Facility: CLINIC | Age: 38
End: 2023-09-25
Payer: COMMERCIAL

## 2024-07-20 ENCOUNTER — HEALTH MAINTENANCE LETTER (OUTPATIENT)
Age: 39
End: 2024-07-20

## 2024-07-21 DIAGNOSIS — F32.A ANXIETY AND DEPRESSION: ICD-10-CM

## 2024-07-21 DIAGNOSIS — F41.9 ANXIETY AND DEPRESSION: ICD-10-CM

## 2025-07-16 DIAGNOSIS — F41.9 ANXIETY AND DEPRESSION: ICD-10-CM

## 2025-07-16 DIAGNOSIS — F32.A ANXIETY AND DEPRESSION: ICD-10-CM

## 2025-07-16 NOTE — TELEPHONE ENCOUNTER
Please contact patient to schedule establish care visit.  Once done, please forward request to Dr. Srinivasan for approval.

## 2025-07-16 NOTE — TELEPHONE ENCOUNTER
Contacted patient and got her scheduled with Dr Tate on Wednesday, July 23rd. Forwarding request per Megan's note below.

## 2025-07-23 ENCOUNTER — OFFICE VISIT (OUTPATIENT)
Dept: FAMILY MEDICINE | Facility: CLINIC | Age: 40
End: 2025-07-23
Payer: COMMERCIAL

## 2025-07-23 VITALS
SYSTOLIC BLOOD PRESSURE: 114 MMHG | TEMPERATURE: 97.9 F | HEIGHT: 67 IN | OXYGEN SATURATION: 97 % | DIASTOLIC BLOOD PRESSURE: 74 MMHG | RESPIRATION RATE: 16 BRPM | WEIGHT: 150.9 LBS | HEART RATE: 63 BPM | BODY MASS INDEX: 23.69 KG/M2

## 2025-07-23 DIAGNOSIS — Z14.8 CARRIER OF DUCHENNE MUSCULAR DYSTROPHY: ICD-10-CM

## 2025-07-23 DIAGNOSIS — R68.82 DECREASED LIBIDO: ICD-10-CM

## 2025-07-23 DIAGNOSIS — F33.0 MAJOR DEPRESSIVE DISORDER, RECURRENT EPISODE, MILD: ICD-10-CM

## 2025-07-23 DIAGNOSIS — F41.9 ANXIETY: ICD-10-CM

## 2025-07-23 DIAGNOSIS — Z00.00 ROUTINE GENERAL MEDICAL EXAMINATION AT A HEALTH CARE FACILITY: Primary | ICD-10-CM

## 2025-07-23 DIAGNOSIS — Z87.898 HISTORY OF HEAVY ALCOHOL CONSUMPTION: ICD-10-CM

## 2025-07-23 PROBLEM — O13.9 GESTATIONAL HTN: Status: RESOLVED | Noted: 2025-07-23 | Resolved: 2025-07-23

## 2025-07-23 PROBLEM — F32.A ANXIETY AND DEPRESSION: Status: ACTIVE | Noted: 2025-07-23

## 2025-07-23 PROCEDURE — 99395 PREV VISIT EST AGE 18-39: CPT | Performed by: STUDENT IN AN ORGANIZED HEALTH CARE EDUCATION/TRAINING PROGRAM

## 2025-07-23 PROCEDURE — G0123 SCREEN CERV/VAG THIN LAYER: HCPCS | Performed by: STUDENT IN AN ORGANIZED HEALTH CARE EDUCATION/TRAINING PROGRAM

## 2025-07-23 PROCEDURE — 3078F DIAST BP <80 MM HG: CPT | Performed by: STUDENT IN AN ORGANIZED HEALTH CARE EDUCATION/TRAINING PROGRAM

## 2025-07-23 PROCEDURE — 99459 PELVIC EXAMINATION: CPT | Performed by: STUDENT IN AN ORGANIZED HEALTH CARE EDUCATION/TRAINING PROGRAM

## 2025-07-23 PROCEDURE — 99214 OFFICE O/P EST MOD 30 MIN: CPT | Mod: 25 | Performed by: STUDENT IN AN ORGANIZED HEALTH CARE EDUCATION/TRAINING PROGRAM

## 2025-07-23 PROCEDURE — 3074F SYST BP LT 130 MM HG: CPT | Performed by: STUDENT IN AN ORGANIZED HEALTH CARE EDUCATION/TRAINING PROGRAM

## 2025-07-23 RX ORDER — BUPROPION HYDROCHLORIDE 150 MG/1
150 TABLET ORAL EVERY MORNING
Qty: 90 TABLET | Refills: 0 | Status: SHIPPED | OUTPATIENT
Start: 2025-07-23

## 2025-07-23 SDOH — HEALTH STABILITY: PHYSICAL HEALTH: ON AVERAGE, HOW MANY DAYS PER WEEK DO YOU ENGAGE IN MODERATE TO STRENUOUS EXERCISE (LIKE A BRISK WALK)?: 5 DAYS

## 2025-07-23 SDOH — HEALTH STABILITY: PHYSICAL HEALTH: ON AVERAGE, HOW MANY MINUTES DO YOU ENGAGE IN EXERCISE AT THIS LEVEL?: 40 MIN

## 2025-07-23 ASSESSMENT — SOCIAL DETERMINANTS OF HEALTH (SDOH): HOW OFTEN DO YOU GET TOGETHER WITH FRIENDS OR RELATIVES?: ONCE A WEEK

## 2025-07-23 NOTE — PATIENT INSTRUCTIONS
Patient Education   Preventive Care Advice   This is general advice given by our system to help you stay healthy. However, your care team may have specific advice just for you. Please talk to your care team about your preventive care needs.  Nutrition  Eat 5 or more servings of fruits and vegetables each day.  Try wheat bread, brown rice and whole grain pasta (instead of white bread, rice, and pasta).  Get enough calcium and vitamin D. Check the label on foods and aim for 100% of the RDA (recommended daily allowance).  Lifestyle  Exercise at least 150 minutes each week  (30 minutes a day, 5 days a week).  Do muscle strengthening activities 2 days a week. These help control your weight and prevent disease.  No smoking.  Wear sunscreen to prevent skin cancer.  Have a dental exam and cleaning every 6 months.  Yearly exams  See your health care team every year to talk about:  Any changes in your health.  Any medicines your care team has prescribed.  Preventive care, family planning, and ways to prevent chronic diseases.  Shots (vaccines)   HPV shots (up to age 26), if you've never had them before.  Hepatitis B shots (up to age 59), if you've never had them before.  COVID-19 shot: Get this shot when it's due.  Flu shot: Get a flu shot every year.  Tetanus shot: Get a tetanus shot every 10 years.  Pneumococcal, hepatitis A, and RSV shots: Ask your care team if you need these based on your risk.  Shingles shot (for age 50 and up)  General health tests  Diabetes screening:  Starting at age 35, Get screened for diabetes at least every 3 years.  If you are younger than age 35, ask your care team if you should be screened for diabetes.  Cholesterol test: At age 39, start having a cholesterol test every 5 years, or more often if advised.  Bone density scan (DEXA): At age 50, ask your care team if you should have this scan for osteoporosis (brittle bones).  Hepatitis C: Get tested at least once in your life.  STIs (sexually  transmitted infections)  Before age 24: Ask your care team if you should be screened for STIs.  After age 24: Get screened for STIs if you're at risk. You are at risk for STIs (including HIV) if:  You are sexually active with more than one person.  You don't use condoms every time.  You or a partner was diagnosed with a sexually transmitted infection.  If you are at risk for HIV, ask about PrEP medicine to prevent HIV.  Get tested for HIV at least once in your life, whether you are at risk for HIV or not.  Cancer screening tests  Cervical cancer screening: If you have a cervix, begin getting regular cervical cancer screening tests starting at age 21.  Breast cancer scan (mammogram): If you've ever had breasts, begin having regular mammograms starting at age 40. This is a scan to check for breast cancer.  Colon cancer screening: It is important to start screening for colon cancer at age 45.  Have a colonoscopy test every 10 years (or more often if you're at risk) Or, ask your provider about stool tests like a FIT test every year or Cologuard test every 3 years.  To learn more about your testing options, visit:   .  For help making a decision, visit:   https://bit.ly/sx16798.  Prostate cancer screening test: If you have a prostate, ask your care team if a prostate cancer screening test (PSA) at age 55 is right for you.  Lung cancer screening: If you are a current or former smoker ages 50 to 80, ask your care team if ongoing lung cancer screenings are right for you.  For informational purposes only. Not to replace the advice of your health care provider. Copyright   2023 [facility]. All rights reserved. Clinically reviewed by the [facility] Transitions Program. [employer] [id number] - REV 01/24.  Learning About Stress  What is stress?     Stress is your body's response to a hard situation. Your body can have a physical, emotional, or mental response. Stress is a fact of life for most people, and it  affects everyone differently. What causes stress for you may not be stressful for someone else.  A lot of things can cause stress. You may feel stress when you go on a job interview, take a test, or run a race. This kind of short-term stress is normal and even useful. It can help you if you need to work hard or react quickly. For example, stress can help you finish an important job on time.  Long-term stress is caused by ongoing stressful situations or events. Examples of long-term stress include long-term health problems, ongoing problems at work, or conflicts in your family. Long-term stress can harm your health.  How does stress affect your health?  When you are stressed, your body responds as though you are in danger. It makes hormones that speed up your heart, make you breathe faster, and give you a burst of energy. This is called the fight-or-flight stress response. If the stress is over quickly, your body goes back to normal and no harm is done.  But if stress happens too often or lasts too long, it can have bad effects. Long-term stress can make you more likely to get sick, and it can make symptoms of some diseases worse. If you tense up when you are stressed, you may develop neck, shoulder, or low back pain. Stress is linked to high blood pressure and heart disease.  Stress also harms your emotional health. It can make you cardenas, tense, or depressed. Your relationships may suffer, and you may not do well at work or school.  What can you do to manage stress?  You can try these things to help manage stress:   Do something active. Exercise or activity can help reduce stress. Walking is a great way to get started. Even everyday activities such as housecleaning or yard work can help.  Try yoga or kirstie chi. These techniques combine exercise and meditation. You may need some training at first to learn them.  Do something you enjoy. For example, listen to music or go to a movie. Practice your hobby or do volunteer  "work.  Meditate. This can help you relax, because you are not worrying about what happened before or what may happen in the future.  Do guided imagery. Imagine yourself in any setting that helps you feel calm. You can use online videos, books, or a teacher to guide you.  Do breathing exercises. For example:  From a standing position, bend forward from the waist with your knees slightly bent. Let your arms dangle close to the floor.  Breathe in slowly and deeply as you return to a standing position. Roll up slowly and lift your head last.  Hold your breath for just a few seconds in the standing position.  Breathe out slowly and bend forward from the waist.  Let your feelings out. Talk, laugh, cry, and express anger when you need to. Talking with supportive friends or family, a counselor, or a elizabeth leader about your feelings is a healthy way to relieve stress. Avoid discussing your feelings with people who make you feel worse.  Write. It may help to write about things that are bothering you. This helps you find out how much stress you feel and what is causing it. When you know this, you can find better ways to cope.  What can you do to prevent stress?  You might try some of these things to help prevent stress:  Manage your time. This helps you find time to do the things you want and need to do.  Get enough sleep. Your body recovers from the stresses of the day while you are sleeping.  Get support. Your family, friends, and community can make a difference in how you experience stress.  Limit your news feed. Avoid or limit time on social media or news that may make you feel stressed.  Do something active. Exercise or activity can help reduce stress. Walking is a great way to get started.  Where can you learn more?  Go to https://www.[employer].net/patiented  Enter N032 in the search box to learn more about \"Learning About Stress.\"  Current as of: October 24, 2024  Content Version: 14.5 2024-2025 [employer] " "LL[employer].   Care instructions adapted under license by your healthcare professional. If you have questions about a medical condition or this instruction, always ask your healthcare professional. [employer] disclaims any warranty or liability for your use of this information.    9 Ways to Cut Back on Drinking  Maybe you've found yourself drinking more alcohol than you'd prefer. If you want to cut back, here are some ideas to try.    Think before you drink.  Do you really want a drink, or is it just a habit? If you're used to having a drink at a certain time, try doing something else then.     Look for substitutes.  Find some no-alcohol drinks that you enjoy, like flavored seltzer water, tea with honey, or tonic with a slice of lime. Or try alcohol-free beer or \"virgin\" cocktails (without the alcohol).     Drink more water.  Use water to quench your thirst. Drink a glass of water before you have any alcohol. Have another glass along with every drink or between drinks.     Shrink your drink.  For example, have a bottle of beer instead of a pint. Use a smaller glass for wine. Choose drinks with lower alcohol content (ABV%). Or use less liquor and more mixer in cocktails.     Slow down.  It's easy to drink quickly and without thinking about it. Pay attention, and make each drink last longer.     Do the math.  Total up how much you spend on alcohol each month. How much is that a year? If you cut back, what could you do with the money you save?     Take a break.  Choose a day or two each week when you won't drink at all. Notice how you feel on those days, physically and emotionally. How did you sleep? Do you feel better? Over time, add more break days.     Count calories.  Would you like to lose some weight? For some people that's a good motivator for cutting back. Figure out how many calories are in each drink. How many does that add up to in a day? In a week? In a month?     Practice saying no.  Be ready when " "someone offers you a drink. Try: \"Thanks, I've had enough.\" Or \"Thanks, but I'm cutting back.\" Or \"No, thanks. I feel better when I drink less.\"   Current as of: August 20, 2024  Content Version: 14.5 2024-2025 [employer]   Care instructions adapted under license by your healthcare professional. If you have questions about a medical condition or this instruction, always ask your healthcare professional. [employer] disclaims any warranty or liability for your use of this information.  Substance Use Disorder: Care Instructions  Overview     You can improve your life and health by stopping your use of alcohol or drugs. When you don't drink or use drugs, you may feel and sleep better. You may get along better with your family, friends, and coworkers. There are medicines and programs that can help with substance use disorder.  How can you care for yourself at home?  Here are some ways to help you stay sober and prevent relapse.  If you have been given medicine to help keep you sober or reduce your cravings, be sure to take it exactly as prescribed.  Talk to your doctor about programs that can help you stop using drugs or drinking alcohol.  Do not keep alcohol or drugs in your home.  Plan ahead. Think about what you'll say if other people ask you to drink or use drugs. Try not to spend time with people who drink or use drugs.  Use the time and money spent on drinking or drugs to do something that's important to you.  Preventing a relapse  Have a plan to deal with relapse. Learn to recognize changes in your thinking that lead you to drink or use drugs. Get help before you start to drink or use drugs again.  Try to stay away from situations, friends, or places that may lead you to drink or use drugs.  If you feel the need to drink alcohol or use drugs again, seek help right away. Call a trusted friend or family member. Some people get support from organizations such as Narcotics Anonymous or " [facility] or from treatment facilities.  If you relapse, get help as soon as you can. Some people make a plan with another person that outlines what they want that person to do for them if they relapse. The plan usually includes how to handle the relapse and who to notify in case of relapse.  Don't give up. Remember that a relapse doesn't mean that you have failed. Use the experience to learn the triggers that lead you to drink or use drugs. Then quit again. Recovery is a lifelong process. Many people have several relapses before they are able to quit for good.  Follow-up care is a key part of your treatment and safety. Be sure to make and go to all appointments, and call your doctor if you are having problems. It's also a good idea to know your test results and keep a list of the medicines you take.  When should you call for help?   Call 911  anytime you think you may need emergency care. For example, call if you or someone else:    Has overdosed or has withdrawal signs. Be sure to tell the emergency workers that you are or someone else is using or trying to quit using drugs. Overdose or withdrawal signs may include:  Losing consciousness.  Seizure.  Seeing or hearing things that aren't there (hallucinations).     Is thinking or talking about suicide or harming others.   Where to get help 24 hours a day, 7 days a week   If you or someone you know talks about suicide, self-harm, a mental health crisis, a substance use crisis, or any other kind of emotional distress, get help right away. You can:    Call the Suicide and Crisis Lifeline at 988.     Call 5-458-665-ILXG (1-613.295.1034).     Text HOME to 667449 to access the Crisis Text Line.   Consider saving these numbers in your phone.  Go to [employer] for more information or to chat online.  Call your doctor now or seek immediate medical care if:    You are having withdrawal symptoms. These may include nausea or vomiting, sweating, shakiness, and anxiety.  "  Watch closely for changes in your health, and be sure to contact your doctor if:    You have a relapse.     You need more help or support to stop.   Where can you learn more?  Go to https://www.[employer].net/patiented  Enter H573 in the search box to learn more about \"Substance Use Disorder: Care Instructions.\"  Current as of: August 20, 2024  Content Version: 14.5 2024-2025 [employer].   Care instructions adapted under license by your healthcare professional. If you have questions about a medical condition or this instruction, always ask your healthcare professional. [employer] disclaims any warranty or liability for your use of this information.       "

## 2025-07-23 NOTE — PROGRESS NOTES
Preventive Care Visit  [facility]  Christa Tate MD, Family Medicine  Jul 23, 2025      Assessment & Plan     Routine general medical examination at a health care facility  HM reviewed and updated.  Would like to defer cholesterol/diabetes screening to next year which I feel is reasonable given her significant exercise/dietary improvements in the last year.  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  She was told to monitor for cardiomyopathy in the future.    Major depressive disorder, recurrent episode, mild  Anxiety  On 50 mg sertraline chronically, symptoms only partially addressed.  Discussed increasing sertraline versus adding bupropion for reported low libido.  She would like to try the latter.  Check in via [employer] in 1 to 2 months to see how it is going and see if dose adjustment is needed.  - sertraline (ZOLOFT) 50 MG tablet; Take 1 tablet (50 mg) by mouth daily.  - buPROPion (WELLBUTRIN XL) 150 MG 24 hr tablet; Take 1 tablet (150 mg) by mouth every morning.    Decreased libido  As above  - buPROPion (WELLBUTRIN XL) 150 MG 24 hr tablet; Take 1 tablet (150 mg) by mouth every morning.    History of heavy alcohol consumption  Has worked to decrease, now only drinking on the weekends.  Has  support.  He has previously told her that if she did not cut down she should go to treatment.  Does think about alcohol most days of the week.  Discussed that we could consider naltrexone in the future if needed for cravings.      Counseling  Appropriate preventive services were addressed with this patient via screening, questionnaire, or discussion as appropriate for fall prevention, nutrition, physical activity, Tobacco-use cessation, social engagement, weight loss and cognition.  Checklist reviewing preventive services available has been given to the patient.  Reviewed patient's  diet, addressing concerns and/or questions.   She is at risk for psychosocial distress and has been provided with information to reduce risk.   The patient reports drinking more than 3 alcoholic drinks per day and/or more than 7 drhnks per week. The patient was counseled and given information about possible harmful effects of excessive alcohol intake.        Gennaro Hawkins is a 39 year old, presenting for the following:  Establish Care and Physical        7/23/2025    11:21 AM   Additional Questions   Roomed by Linda          HPI     Lost a bunch of weight last year, has been working out a lot and doing calorie deficit  Gets up early to go to [facility]   No longer in calorie deficit   Has helped with MH  Son has shubham MENON - recent diagnosis in last fw years. In clinical trial, in     Works at [employer]   has snap on [employer] company    On sertraline  No official diagnosis of GIOVANNA or MDD  Feels a little calmer  Notes feels more anxious if doesn't   No panic attacks in awhile    Low libido    Periods regular but little more frequent lately         Discussed advance care planning with patient; however, patient declined at this time.        7/23/2025   General Health   How would you rate your overall physical health? Good   Feel stress (tense, anxious, or unable to sleep) Rather much   (!) STRESS CONCERN      7/23/2025   Nutrition   Three or more servings of calcium each day? Yes   Diet: Regular (no restrictions)   How many servings of fruit and vegetables per day? (!) 2-3   How many sweetened beverages each day? 0-1         7/23/2025   Exercise   Days per week of moderate/strenous exercise 5 days   Average minutes spent exercising at this level 40 min         7/23/2025   Social Factors   Frequency of gathering with friends or relatives Once a week   Worry food won't last until get money to buy more No   Food not last or not have enough money for food? No   Do you have housing? (Housing is defined  as stable permanent housing and does not include staying outside in a car, in a tent, in an abandoned building, in an overnight shelter, or couch-surfing.) Yes   Are you worried about losing your housing? No   Lack of transportation? No   Unable to get utilities (heat,electricity)? No         2025   Dental   Dentist two times every year? Yes         Today's PHQ-2 Score:       2025    11:16 AM   PHQ-2 (  [employer])   Q1: Little interest or pleasure in doing things 1   Q2: Feeling down, depressed or hopeless 1   PHQ-2 Score 2    Q1: Little interest or pleasure in doing things Several days   Q2: Feeling down, depressed or hopeless Several days   PHQ-2 Score 2       Patient-reported           2025   Substance Use   Alcohol more than 3/day or more than 7/wk Yes   How often do you have a drink containing alcohol 2 to 3 times a week   How many alcohol drinks on typical day 3 or 4   How often do you have 5+ drinks at one occasion Monthly   Audit 2/3 Score 3   How often not able to stop drinking once started Monthly   How often failed to do what normally expected Less than monthly   How often needed first drink in am after a heavy drinking session Never   How often feeling of guilt or remorse after drinking Monthly   How often unable to remember what happened the night before Less than monthly   Have you or someone else been injured because of your drinking No   Has anyone been concerned or suggested you cut down on drinking Yes, during the last year   TOTAL SCORE - AUDIT 16   Do you use any other substances recreationally? (!) CANNABIS PRODUCTS - vape before bed - helps with sleep     Social History     Tobacco Use    Smoking status: Former     Current packs/day: 0.00     Types: Cigarettes     Quit date:      Years since quittin.5    Smokeless tobacco: Current     Types: Chew   Vaping Use    Vaping status: Some Days   Substance Use Topics    Alcohol use: Not Currently     Comment: couple drinks  "daily with dinner    Drug use: Never            Mammogram Screening - Patient under 40 years of age: Routine Mammogram Screening not recommended.           7/23/2025   One time HIV Screening   Previous HIV test? No         7/23/2025   STI Screening   New sexual partner(s) since last STI/HIV test? No     History of abnormal Pap smear: No - age 30- 64 PAP with HPV every 5 years recommended             7/23/2025   Contraception/Family Planning   Questions about contraception or family planning No   What are your periods like? (!) IRREGULAR        Reviewed and updated as needed this visit by Provider   Tobacco  Allergies  Meds  Problems  Med Hx  Surg Hx  Fam Hx     Sexual Activity                 Objective    Exam  /74   Pulse 63   Temp 97.9  F (36.6  C) (Tympanic)   Resp 16   Ht 1.689 m (5' 6.5\")   Wt 68.4 kg (150 lb 14.4 oz)   LMP 06/29/2025 (Approximate)   SpO2 97%   BMI 23.99 kg/m     Estimated body mass index is 23.99 kg/m  as calculated from the following:    Height as of this encounter: 1.689 m (5' 6.5\").    Weight as of this encounter: 68.4 kg (150 lb 14.4 oz).    Physical Exam  Constitutional:       General: She is not in acute distress.     Appearance: Normal appearance. She is not ill-appearing.   HENT:      Right Ear: Tympanic membrane, ear canal and external ear normal.      Left Ear: Tympanic membrane, ear canal and external ear normal.      Nose: Nose normal.      Mouth/Throat:      Mouth: Mucous membranes are moist.   Eyes:      Conjunctiva/sclera: Conjunctivae normal.   Neck:      Thyroid: No thyroid mass, thyromegaly or thyroid tenderness.   Cardiovascular:      Rate and Rhythm: Normal rate and regular rhythm.      Heart sounds: Normal heart sounds.   Pulmonary:      Effort: Pulmonary effort is normal.      Breath sounds: Normal breath sounds.   Abdominal:      General: Abdomen is flat. There is no distension.      Palpations: Abdomen is soft. There is no mass.      Tenderness: " There is no abdominal tenderness.   Genitourinary:     General: Normal vulva.      Vagina: Normal.      Cervix: Normal. No cervical motion tenderness.      Uterus: Normal.       Adnexa: Right adnexa normal and left adnexa normal.      Comments: Declined chaperone  Musculoskeletal:      Cervical back: No tenderness.      Right lower leg: No edema.      Left lower leg: No edema.   Lymphadenopathy:      Cervical: No cervical adenopathy.   Skin:     General: Skin is warm.   Neurological:      General: No focal deficit present.      Mental Status: She is alert.   Psychiatric:         Mood and Affect: Mood normal.         Behavior: Behavior normal.             Signed Electronically by: Christa Tate MD

## 2025-07-24 LAB
HPV HR 12 DNA CVX QL NAA+PROBE: NEGATIVE
HPV16 DNA CVX QL NAA+PROBE: NEGATIVE
HPV18 DNA CVX QL NAA+PROBE: NEGATIVE
HUMAN PAPILLOMA VIRUS FINAL DIAGNOSIS: NORMAL

## 2025-07-29 LAB
BKR AP ASSOCIATED HPV REPORT: NORMAL
BKR LAB AP GYN ADEQUACY: NORMAL
BKR LAB AP GYN INTERPRETATION: NORMAL
BKR LAB AP LMP: NORMAL
BKR LAB AP PREVIOUS ABNORMAL: NORMAL
PATH REPORT.COMMENTS IMP SPEC: NORMAL
PATH REPORT.COMMENTS IMP SPEC: NORMAL
PATH REPORT.RELEVANT HX SPEC: NORMAL

## (undated) DEVICE — Device

## (undated) DEVICE — SUTURE VICRYL+ 1 27IN CT-1 UND VCP261H

## (undated) DEVICE — KIT ACL TRANSTIBIAL  AR-1898S

## (undated) DEVICE — DRAPE STERI U 1015

## (undated) DEVICE — SUCTION IRR SYSTEM W/O TIP INTERPULSE HANDPIECE 0210-100-000

## (undated) DEVICE — DRESSING XEROFORM PETROLATUM 5X9 33605

## (undated) DEVICE — BUR ARTHREX COOLCUT EXCALIBUR 4.0MMX13CM AR-8400EX

## (undated) DEVICE — TUBING SUCTION MEDI-VAC 1/4"X20' N620A - HE

## (undated) DEVICE — SU FIBERWIRE 2 38" T-8 NDL  AR-7206

## (undated) DEVICE — CUSTOM PACK TOTAL KNEE SOP5BTKHEC

## (undated) DEVICE — NDL SU ARTHREX 2-0 SUTURETAPE MENISCUS REPAIR AR-7523

## (undated) DEVICE — HARVESTER OATS SYSTEM ARTHEX 22.5MM ABS-4057D-225

## (undated) DEVICE — SUTURE SUTURETAPE 36.6X1.3MM 2.5 CRC BLACK WHITE AR-7506T

## (undated) DEVICE — SUCTION TIP YANKAUER FLEX ORTHO K62

## (undated) DEVICE — PLATE GROUNDING ADULT W/CORD 9165L

## (undated) DEVICE — PIN DRILL ARTHREX ACL TIGHTROPE CLOSED EYELET 4MM AR-1595TC

## (undated) DEVICE — GLOVE BIOGEL INDICATOR 7.5 LF 41675

## (undated) DEVICE — SU FIBERWIRE 2-0 TAPER CUT AR-7220

## (undated) DEVICE — SU FIBERWIRE 2 38"  AR-7200

## (undated) DEVICE — GLOVE BIOGEL PI ULTRATOUCH G SZ 8.5 42185

## (undated) DEVICE — SU FIBERWIRE #2 FIBERSTICK 50"  AR-7209

## (undated) DEVICE — TUBING SUCTION MEDI-VAC 1/4"X20' N620A

## (undated) DEVICE — DRSG STERI STRIP 1/2X4" R1547

## (undated) DEVICE — SU TIGERSTICK #2 TIGERWIRE 50" STIFF END 12" AR-7209T

## (undated) DEVICE — HOLDER LIMB VELCRO OR 0814-1533

## (undated) DEVICE — MARKER SURG SKIN STRL 77734

## (undated) DEVICE — ESU PENCIL SMOKE EVAC W/ROCKER SWITCH 0703-047-000

## (undated) DEVICE — PREP CHLORAPREP 26ML TINTED HI-LITE ORANGE 930815

## (undated) DEVICE — ARTHREX FLIPCUTTER III DRILL AR-1204FF

## (undated) DEVICE — PACK MINOR SINGLE BASIN SSK3001

## (undated) DEVICE — BLADE KNIFE SURG 15 371115

## (undated) DEVICE — COUNTER NEEDLE ADH & FOAM 20CT 9106

## (undated) DEVICE — SUCTION MANIFOLD NEPTUNE 2 SYS 4 PORT 0702-020-000

## (undated) DEVICE — BLADE SAGITTAL WIDE THIN 2108-131

## (undated) DEVICE — DRSG GAUZE 4X4" TRAY 6939

## (undated) DEVICE — BLADE KNIFE SURG 10 371110

## (undated) DEVICE — ABLATOR ARTHREX APOLLO RF MP90 ASPIRATING 90DEG AR-9811

## (undated) DEVICE — CUSTOM PACK ARTHROSCOPY KNEE SOP5BAKHEA

## (undated) DEVICE — SUTURE VICRYL+ 2-0 27IN CT-1 UND VCP259H

## (undated) DEVICE — TUBING SYSTEM ARTHREX PUMP REDEUCE AR-6411

## (undated) DEVICE — MAT FLOOR SURGICAL 40X38 0702140238

## (undated) DEVICE — SOL WATER IRRIG 1000ML BOTTLE 2F7114

## (undated) DEVICE — SUTURE VICRYL+ 0 27IN CT-1 UND VCP260H

## (undated) DEVICE — NEEDLE SUTURE ANCHOR 1824-5DC

## (undated) DEVICE — SOLUTION IRRIG 2B7127 .9NS 3000ML BAG

## (undated) DEVICE — GLOVE UNDER INDICATOR PI SZ 8.5 LF 41685

## (undated) DEVICE — COVER LIGHT HANDLE STRL SGL USE AM3612

## (undated) DEVICE — SU MONOCRYL 3-0 PS-2 18" UND MCP497G

## (undated) DEVICE — BANDAGE ELASTIC 6X550 LF DBL 593-96LF

## (undated) DEVICE — BUR ARTHREX COOLCUT DBL CUT CVD 4.0MMX130MM AR-8400CDCS

## (undated) DEVICE — BLADE KNIFE SURG 11 371111

## (undated) DEVICE — DRSG ABD TNDRSRB WET PRUF 8IN X 10IN STRL  9194A

## (undated) DEVICE — SOL NACL 0.9% IRRIG 1000ML BOTTLE 2F7124

## (undated) DEVICE — GLOVE SURG PI ULTRA TOUCH M SZ 8 LF

## (undated) DEVICE — BONE CLEANING TIP INTERPULSE  0210-010-000

## (undated) DEVICE — SYR BULB IRRIG DOVER 60 ML LATEX FREE 67000

## (undated) DEVICE — DEVICE PASSER LASSO 90DEG AR-6068-90

## (undated) DEVICE — GOWN SURGICAL SMARTGOWN 2XL 89075

## (undated) DEVICE — TUBING SYSTEM ARTHREX PATIENT REDEUCE AR-6421

## (undated) DEVICE — DRAPE BACK TABLE PADDED 60X90

## (undated) DEVICE — BANDAGE ESMARK 6 X 12FT STL 578163

## (undated) DEVICE — SU LOOP #2 FIBERLOOP  AR-7234

## (undated) DEVICE — DRAPE SHEET REV FOLD 3/4 9349

## (undated) RX ORDER — PROPOFOL 10 MG/ML
INJECTION, EMULSION INTRAVENOUS
Status: DISPENSED
Start: 2023-01-27

## (undated) RX ORDER — LIDOCAINE HYDROCHLORIDE 10 MG/ML
INJECTION, SOLUTION EPIDURAL; INFILTRATION; INTRACAUDAL; PERINEURAL
Status: DISPENSED
Start: 2023-01-27

## (undated) RX ORDER — FENTANYL CITRATE 50 UG/ML
INJECTION, SOLUTION INTRAMUSCULAR; INTRAVENOUS
Status: DISPENSED
Start: 2023-01-27

## (undated) RX ORDER — DEXAMETHASONE SODIUM PHOSPHATE 4 MG/ML
INJECTION, SOLUTION INTRA-ARTICULAR; INTRALESIONAL; INTRAMUSCULAR; INTRAVENOUS; SOFT TISSUE
Status: DISPENSED
Start: 2023-01-27

## (undated) RX ORDER — ONDANSETRON 2 MG/ML
INJECTION INTRAMUSCULAR; INTRAVENOUS
Status: DISPENSED
Start: 2023-01-27